# Patient Record
Sex: MALE | Race: WHITE | NOT HISPANIC OR LATINO
[De-identification: names, ages, dates, MRNs, and addresses within clinical notes are randomized per-mention and may not be internally consistent; named-entity substitution may affect disease eponyms.]

---

## 2020-08-06 ENCOUNTER — APPOINTMENT (OUTPATIENT)
Dept: CARDIOLOGY | Facility: CLINIC | Age: 30
End: 2020-08-06
Payer: MEDICAID

## 2020-08-06 VITALS — WEIGHT: 222 LBS | HEIGHT: 71 IN | TEMPERATURE: 97.6 F | BODY MASS INDEX: 31.08 KG/M2 | HEART RATE: 77 BPM

## 2020-08-06 VITALS — DIASTOLIC BLOOD PRESSURE: 84 MMHG | SYSTOLIC BLOOD PRESSURE: 120 MMHG

## 2020-08-06 PROCEDURE — 93000 ELECTROCARDIOGRAM COMPLETE: CPT

## 2020-08-06 PROCEDURE — 99204 OFFICE O/P NEW MOD 45 MIN: CPT

## 2020-08-06 NOTE — ASSESSMENT
[FreeTextEntry1] : The patient has had palpitations and sinus tachycardia and HTN for the past 3 days .BP is borderline on home blood monitor . The patient has had periods of chest pressure . The episodes are fleeting . Aldosterone and renin levels are normal.

## 2020-08-06 NOTE — HISTORY OF PRESENT ILLNESS
[FreeTextEntry1] : The patient has been hypertensive for the past 3 years . He has had increased anxiety which started at that time. He felt as thugh his heart was racing and at times he felt as though his chest felt jolts.  Occasionally gets chest tightness . No DM , She does have a family history of early CAD .

## 2020-08-06 NOTE — PHYSICAL EXAM
[General Appearance - Well Developed] : well developed [Normal Appearance] : normal appearance [Well Groomed] : well groomed [General Appearance - Well Nourished] : well nourished [No Deformities] : no deformities [General Appearance - In No Acute Distress] : no acute distress [Normal Conjunctiva] : the conjunctiva exhibited no abnormalities [Eyelids - No Xanthelasma] : the eyelids demonstrated no xanthelasmas [Normal Oral Mucosa] : normal oral mucosa [No Oral Pallor] : no oral pallor [No Oral Cyanosis] : no oral cyanosis [Normal Jugular Venous A Waves Present] : normal jugular venous A waves present [Normal Jugular Venous V Waves Present] : normal jugular venous V waves present [No Jugular Venous Colon A Waves] : no jugular venous colon A waves [Normal Rate] : normal [Rhythm Regular] : regular [No Murmur] : no murmurs heard [2+] : right 2+ [No Pitting Edema] : no pitting edema present [Respiration, Rhythm And Depth] : normal respiratory rhythm and effort [Exaggerated Use Of Accessory Muscles For Inspiration] : no accessory muscle use [Abdomen Soft] : soft [Auscultation Breath Sounds / Voice Sounds] : lungs were clear to auscultation bilaterally [Abdomen Tenderness] : non-tender [Abdomen Mass (___ Cm)] : no abdominal mass palpated [Gait - Sufficient For Exercise Testing] : the gait was sufficient for exercise testing [Abnormal Walk] : normal gait [Cyanosis, Localized] : no localized cyanosis [Nail Clubbing] : no clubbing of the fingernails [Petechial Hemorrhages (___cm)] : no petechial hemorrhages [Skin Color & Pigmentation] : normal skin color and pigmentation [] : no rash [No Venous Stasis] : no venous stasis [Skin Lesions] : no skin lesions [No Skin Ulcers] : no skin ulcer [No Xanthoma] : no  xanthoma was observed [Oriented To Time, Place, And Person] : oriented to person, place, and time [Mood] : the mood was normal [Affect] : the affect was normal [No Anxiety] : not feeling anxious

## 2020-08-06 NOTE — REASON FOR VISIT
[Hyperlipidemia] : hyperlipidemia [FreeTextEntry1] : Sinus tachycardia .  [Hypertension] : hypertension

## 2020-08-06 NOTE — REVIEW OF SYSTEMS
[Shortness Of Breath] : shortness of breath [Chest Pain] : chest pain [Anxiety] : anxiety [Palpitations] : palpitations [Negative] : Heme/Lymph

## 2020-08-12 ENCOUNTER — APPOINTMENT (OUTPATIENT)
Dept: CARDIOLOGY | Facility: CLINIC | Age: 30
End: 2020-08-12
Payer: MEDICAID

## 2020-08-12 PROCEDURE — 93325 DOPPLER ECHO COLOR FLOW MAPG: CPT

## 2020-08-12 PROCEDURE — 93320 DOPPLER ECHO COMPLETE: CPT

## 2020-08-12 PROCEDURE — 93351 STRESS TTE COMPLETE: CPT

## 2020-09-30 ENCOUNTER — APPOINTMENT (OUTPATIENT)
Dept: CARDIOLOGY | Facility: CLINIC | Age: 30
End: 2020-09-30
Payer: MEDICAID

## 2020-09-30 VITALS — SYSTOLIC BLOOD PRESSURE: 154 MMHG | HEART RATE: 93 BPM | DIASTOLIC BLOOD PRESSURE: 90 MMHG

## 2020-09-30 PROCEDURE — 99214 OFFICE O/P EST MOD 30 MIN: CPT | Mod: 95

## 2020-09-30 NOTE — ASSESSMENT
[FreeTextEntry1] : The patient has had less palpitations but these are fleeting in nature . He is on beta blockers and his BP is controlled to borderline controlled. The patient has had marked anxiety . He is going for cognitive therapy . I have referrrred him to [place] from fear .

## 2020-09-30 NOTE — HISTORY OF PRESENT ILLNESS
[Home] : at home, [unfilled] , at the time of the visit. [Medical Office: ([facility])___] : at the medical office located in  [Verbal consent obtained from patient] : the patient, [unfilled] [FreeTextEntry1] : The patient states that his palpitations occur only during times of anxiety . He had a MCOT monitor . During this time he did have a mild sinus tachycardia, otherwise no arrhythmias . The patient had a stress echo in which he completed stage IV N ischemia on echo . There was a prominent papillary muscle and chordae . Mitral valve itself looked normal No chest pain

## 2020-09-30 NOTE — REVIEW OF SYSTEMS
[Shortness Of Breath] : shortness of breath [Chest Pain] : chest pain [Palpitations] : palpitations [Anxiety] : anxiety [Negative] : Heme/Lymph

## 2020-09-30 NOTE — PHYSICAL EXAM
[General Appearance - Well Developed] : well developed [Normal Appearance] : normal appearance [Well Groomed] : well groomed [General Appearance - Well Nourished] : well nourished [No Deformities] : no deformities [General Appearance - In No Acute Distress] : no acute distress [Normal Conjunctiva] : the conjunctiva exhibited no abnormalities [Eyelids - No Xanthelasma] : the eyelids demonstrated no xanthelasmas [Normal Oral Mucosa] : normal oral mucosa [No Oral Pallor] : no oral pallor [No Oral Cyanosis] : no oral cyanosis [Normal Jugular Venous A Waves Present] : normal jugular venous A waves present [Normal Jugular Venous V Waves Present] : normal jugular venous V waves present [No Jugular Venous Colon A Waves] : no jugular venous colon A waves [Respiration, Rhythm And Depth] : normal respiratory rhythm and effort [Exaggerated Use Of Accessory Muscles For Inspiration] : no accessory muscle use [Auscultation Breath Sounds / Voice Sounds] : lungs were clear to auscultation bilaterally [Abdomen Soft] : soft [Abdomen Tenderness] : non-tender [Abdomen Mass (___ Cm)] : no abdominal mass palpated [Abnormal Walk] : normal gait [Gait - Sufficient For Exercise Testing] : the gait was sufficient for exercise testing [Nail Clubbing] : no clubbing of the fingernails [Cyanosis, Localized] : no localized cyanosis [Petechial Hemorrhages (___cm)] : no petechial hemorrhages [Skin Color & Pigmentation] : normal skin color and pigmentation [] : no rash [No Venous Stasis] : no venous stasis [Skin Lesions] : no skin lesions [No Skin Ulcers] : no skin ulcer [No Xanthoma] : no  xanthoma was observed [Oriented To Time, Place, And Person] : oriented to person, place, and time [Affect] : the affect was normal [Mood] : the mood was normal [No Anxiety] : not feeling anxious [Normal Rate] : normal [Rhythm Regular] : regular [No Murmur] : no murmurs heard [2+] : left 2+ [No Pitting Edema] : no pitting edema present

## 2020-09-30 NOTE — REASON FOR VISIT
[Follow-Up - Clinic] : a clinic follow-up of [Hyperlipidemia] : hyperlipidemia [Hypertension] : hypertension [FreeTextEntry1] : Sinus tachycardia .

## 2020-10-14 ENCOUNTER — APPOINTMENT (OUTPATIENT)
Dept: CARDIOLOGY | Facility: CLINIC | Age: 30
End: 2020-10-14

## 2020-11-19 ENCOUNTER — APPOINTMENT (OUTPATIENT)
Dept: CARDIOLOGY | Facility: CLINIC | Age: 30
End: 2020-11-19
Payer: MEDICAID

## 2020-11-19 VITALS — HEART RATE: 92 BPM | TEMPERATURE: 97.5 F | WEIGHT: 206 LBS | BODY MASS INDEX: 28.84 KG/M2 | HEIGHT: 71 IN

## 2020-11-19 VITALS — DIASTOLIC BLOOD PRESSURE: 80 MMHG | SYSTOLIC BLOOD PRESSURE: 122 MMHG

## 2020-11-19 PROCEDURE — 93000 ELECTROCARDIOGRAM COMPLETE: CPT

## 2020-11-19 PROCEDURE — 99214 OFFICE O/P EST MOD 30 MIN: CPT

## 2020-11-19 NOTE — ASSESSMENT
[FreeTextEntry1] : The patient has a prominent papillary  muscle and has panic attacks . He has had a MCOT monitor . He also has calcification of the mitral chordae . In view of ECG changes  and vague symptoms will  get cardiac MRI .

## 2020-11-19 NOTE — HISTORY OF PRESENT ILLNESS
[FreeTextEntry1] : The patient states that his palpitations occur only during times of anxiety . He had a MCOT monitor . During this time he did have a mild sinus tachycardia, otherwise no arrhythmias . The patient had a stress echo in which he completed stage IV N ischemia on echo . There was a prominent papillary muscle and chordae . Mitral valve itself looked normal No chest pain. Palpitations have been improved .

## 2020-12-13 ENCOUNTER — EMERGENCY (EMERGENCY)
Facility: HOSPITAL | Age: 30
LOS: 0 days | Discharge: HOME | End: 2020-12-13
Attending: EMERGENCY MEDICINE | Admitting: EMERGENCY MEDICINE
Payer: MEDICAID

## 2020-12-13 VITALS
RESPIRATION RATE: 18 BRPM | HEART RATE: 98 BPM | DIASTOLIC BLOOD PRESSURE: 87 MMHG | OXYGEN SATURATION: 99 % | TEMPERATURE: 97 F | WEIGHT: 197.98 LBS | SYSTOLIC BLOOD PRESSURE: 146 MMHG | HEIGHT: 70 IN

## 2020-12-13 VITALS
OXYGEN SATURATION: 99 % | DIASTOLIC BLOOD PRESSURE: 91 MMHG | HEART RATE: 97 BPM | RESPIRATION RATE: 16 BRPM | SYSTOLIC BLOOD PRESSURE: 144 MMHG

## 2020-12-13 DIAGNOSIS — Z87.09 PERSONAL HISTORY OF OTHER DISEASES OF THE RESPIRATORY SYSTEM: ICD-10-CM

## 2020-12-13 DIAGNOSIS — R20.2 PARESTHESIA OF SKIN: ICD-10-CM

## 2020-12-13 DIAGNOSIS — Z20.828 CONTACT WITH AND (SUSPECTED) EXPOSURE TO OTHER VIRAL COMMUNICABLE DISEASES: ICD-10-CM

## 2020-12-13 DIAGNOSIS — R51.9 HEADACHE, UNSPECIFIED: ICD-10-CM

## 2020-12-13 DIAGNOSIS — Z86.59 PERSONAL HISTORY OF OTHER MENTAL AND BEHAVIORAL DISORDERS: ICD-10-CM

## 2020-12-13 DIAGNOSIS — I10 ESSENTIAL (PRIMARY) HYPERTENSION: ICD-10-CM

## 2020-12-13 PROCEDURE — 70450 CT HEAD/BRAIN W/O DYE: CPT | Mod: 26

## 2020-12-13 PROCEDURE — 99284 EMERGENCY DEPT VISIT MOD MDM: CPT

## 2020-12-13 RX ORDER — ACETAMINOPHEN 500 MG
975 TABLET ORAL ONCE
Refills: 0 | Status: COMPLETED | OUTPATIENT
Start: 2020-12-13 | End: 2020-12-13

## 2020-12-13 RX ADMIN — Medication 975 MILLIGRAM(S): at 03:24

## 2020-12-13 NOTE — ED PROVIDER NOTE - OBJECTIVE STATEMENT
31 yo male hx of anxiety present c/o right sided headache x 1 hour. reported he woke up around 2am with right sided headache. reported it was sharp/warm sensation and subsided gradually. also reported right sided facial tingling sensation. reported he had similar symptoms off/on for a month. He was evaluated by his PMD and Neurologist and had MRI 2 weeks ago without significant findings.   Denies Dizziness/slur speech/extremities weakness and numbness/ facial numbness and weakness. Denies Fever/chill/recent illness/coughing/chest pain/sob/abd pain/n/v/d/extremities pain/urinary sxs.

## 2020-12-13 NOTE — ED PROVIDER NOTE - PROGRESS NOTE DETAILS
Upon discharge, patient also request for COVID test. patient is asymtomatic. will send a routine COVID HA improved. Upon discharge, patient also request for COVID test. patient is asymtomatic. will send a routine COVID

## 2020-12-13 NOTE — ED PROVIDER NOTE - CARE PROVIDER_API CALL
Jorje Patterson  NEUROLOGY  [address] [place] [id number]  Phone: [phone]  Fax: [phone]  Follow Up Time:

## 2020-12-13 NOTE — ED PROVIDER NOTE - ATTENDING CONTRIBUTION TO CARE
I personally evaluated the patient. I reviewed the Resident’s or Physician Assistant’s note (as assigned above), and agree with the findings and plan except as documented in my note.  Chart reviewed.  H/O anxiety disorder, woke up with right sided headache and paresthesias tonight, describes it as a zap.  No fever or photophobia.  Feels better now.  Has seen a neurologist for same symptoms which has been going on for a month.  Exam shows alert in no distress, HEENT NCAT, PERRL, neck supple, lungs clear, RR S1S2, abdomen soft NT +BS, no rash, neuro A&OX3 no deficits.

## 2020-12-13 NOTE — ED PROVIDER NOTE - PATIENT PORTAL LINK FT
You can access the FollowMyHealth Patient Portal offered by [facility] by registering at the following website: [email or web address]. By joining [employer]’s FollowMyHealth portal, you will also be able to view your health information using other applications (apps) compatible with our system.

## 2020-12-13 NOTE — ED PROVIDER NOTE - NSFOLLOWUPCLINICS_GEN_ALL_ED_FT
[facility]  OP [facility]  [address] [place] [id number]  Phone: [phone]  Fax:   Follow Up Time:

## 2020-12-13 NOTE — ED ADULT NURSE NOTE - CAS DISCH ACCOMP BY
Thanks for visiting us today!    You were given a packet of information handouts at today's well child exam. Please keep them handy for future reference.        Remember these important phone numbers:    [phone] for phone nurses during the day and our nurse answering service at night    [phone]  for scheduling or changing future appointments    When leaving a message for our staff, please include:   • the spelling of your child’s full name and date of birth  • your full name and relationship to child  • best phone number and time to reach you   • reason for the call    Is your child signed up for [employer]? If you do this, you can message us rather than playing phone tag! Go to your own account first. On the right hand side of your page, click the button marked \"Request Access to my Child's Records.\" Fill out the information. In a few day's your child's information will be linked to your account. It's that simple!! Here is the website for more information:     [email or web address]    If you haven't already liked us on [employer], please do so!  Just search for \"[facility]\"      --------------------------------------------------------------------------------------------------------------------  You have been referred for an appointment in the nutrition department.    Please call your insurance company to see if a referral is necessary.  If your insurance company says a referral is needed, please call our office at [phone].  Ask to speak to a referral specialist, ext. [phone] or [phone], and give them the following information:    1) Appointment date    2) Specialist's full name (first and last name)    3) Who referred    4) Diagnosis (BMI 99th percentile)    5) Name of the insurance company    Without this information the referral paperwork cannot be completed.         Self

## 2020-12-13 NOTE — ED PROVIDER NOTE - PHYSICAL EXAMINATION
CONSTITUTIONAL: Well-appearing; well-nourished; in no apparent distress.   EYES: PERRL; EOM intact.   ENT: normal nose; no rhinorrhea; normal pharynx with no tonsillar hypertrophy. TM nml b/l.   CARDIOVASCULAR: Normal S1, S2; no murmurs, rubs, or gallops.   RESPIRATORY: Normal chest excursion with respiration; breath sounds clear and equal bilaterally; no wheezes, rhonchi, or rales.  GI/: Normal bowel sounds; non-distended; non-tender; no palpable organomegaly.   MS: No evidence of trauma or deformity to extremities. no midline tenderness to neck. + mild ttp over right occipital muscle and trapezius.   SKIN: Normal for age and race; warm; dry; good turgor; no apparent lesions or exudate.   NEURO/PSYCH: A & O x 4; CN II-XII grossly unremarkable. no drifting. strength equal to b/l upper and lower extremities. speaking coherently. nml cerebellum test. ambulate with nml and steady gait

## 2020-12-13 NOTE — ED ADULT TRIAGE NOTE - CHIEF COMPLAINT QUOTE
tingling right side head/face, woke up with a zap of pain right side head, saw neurologist and going for second opinion

## 2020-12-13 NOTE — ED PROVIDER NOTE - DISCHARGE REVIEW MATERIAL PRESENTED
[FreeTextEntry1] : Basic cardiovascular prevention measures are advised including regular exercise, surveillance medical examination, and prudent portion-controlled low fat diet, rich in a variety of vegetables with minimal added sugars, refined starches, and no artificially hydrogenated oils.\par add toprol xl 50 mg 1 qd\par cont losartan and amlodipine. \par obtain labs from oncology\par bone density from [facility]\par We spent sufficient time to discuss aspects of care; questions were answered  to patient's satisfaction.The diagnosis and care plan were discussed with patient in detail.  Patient test results were  reviewed and explained in full. All questions and concerns  were answered to the best of my knowledge.\par 
.

## 2020-12-14 LAB — SARS-COV-2 RNA SPEC QL NAA+PROBE: SIGNIFICANT CHANGE UP

## 2020-12-16 PROBLEM — F41.9 ANXIETY DISORDER, UNSPECIFIED: Chronic | Status: ACTIVE | Noted: 2020-12-13

## 2020-12-16 PROBLEM — R42 DIZZINESS AND GIDDINESS: Chronic | Status: ACTIVE | Noted: 2020-12-13

## 2020-12-16 PROBLEM — I10 ESSENTIAL (PRIMARY) HYPERTENSION: Chronic | Status: ACTIVE | Noted: 2020-12-13

## 2020-12-16 PROBLEM — R00.2 PALPITATIONS: Chronic | Status: ACTIVE | Noted: 2020-12-13

## 2020-12-16 PROBLEM — J32.9 CHRONIC SINUSITIS, UNSPECIFIED: Chronic | Status: ACTIVE | Noted: 2020-12-13

## 2020-12-22 ENCOUNTER — RESULT REVIEW (OUTPATIENT)
Age: 30
End: 2020-12-22

## 2020-12-22 ENCOUNTER — OUTPATIENT (OUTPATIENT)
Dept: OUTPATIENT SERVICES | Facility: HOSPITAL | Age: 30
LOS: 1 days | Discharge: HOME | End: 2020-12-22
Payer: MEDICAID

## 2020-12-22 DIAGNOSIS — R94.31 ABNORMAL ELECTROCARDIOGRAM [ECG] [EKG]: ICD-10-CM

## 2020-12-22 DIAGNOSIS — R00.2 PALPITATIONS: ICD-10-CM

## 2020-12-22 PROCEDURE — 75561 CARDIAC MRI FOR MORPH W/DYE: CPT | Mod: 26

## 2021-01-16 ENCOUNTER — APPOINTMENT (OUTPATIENT)
Dept: CARDIOLOGY | Facility: CLINIC | Age: 31
End: 2021-01-16
Payer: MEDICAID

## 2021-01-16 VITALS
SYSTOLIC BLOOD PRESSURE: 140 MMHG | BODY MASS INDEX: 28.28 KG/M2 | HEART RATE: 89 BPM | TEMPERATURE: 98.6 F | WEIGHT: 202 LBS | DIASTOLIC BLOOD PRESSURE: 80 MMHG | HEIGHT: 71 IN

## 2021-01-16 VITALS — SYSTOLIC BLOOD PRESSURE: 124 MMHG | DIASTOLIC BLOOD PRESSURE: 80 MMHG

## 2021-01-16 DIAGNOSIS — Z78.9 OTHER SPECIFIED HEALTH STATUS: ICD-10-CM

## 2021-01-16 PROCEDURE — 99214 OFFICE O/P EST MOD 30 MIN: CPT

## 2021-01-16 PROCEDURE — 93000 ELECTROCARDIOGRAM COMPLETE: CPT

## 2021-01-16 PROCEDURE — 99072 ADDL SUPL MATRL&STAF TM PHE: CPT

## 2021-01-16 NOTE — ASSESSMENT
[FreeTextEntry1] : The patient has a prominent papillary  muscle and has panic attacks . MRI was normal. No late saqib as well.  No significaint arrhythmias on MCOT monitor . On beta blockers . ETT echo was negative for ischemia Much of his palpitations may have been anxiety related as well, certainly a component of this . Catecholamines  and metanephrines were normal.

## 2021-01-16 NOTE — PHYSICAL EXAM
[Normal Appearance] : normal appearance [General Appearance - Well Developed] : well developed [Well Groomed] : well groomed [General Appearance - Well Nourished] : well nourished [No Deformities] : no deformities [Normal Conjunctiva] : the conjunctiva exhibited no abnormalities [General Appearance - In No Acute Distress] : no acute distress [Eyelids - No Xanthelasma] : the eyelids demonstrated no xanthelasmas [Normal Oral Mucosa] : normal oral mucosa [No Oral Pallor] : no oral pallor [No Oral Cyanosis] : no oral cyanosis [Normal Jugular Venous A Waves Present] : normal jugular venous A waves present [Normal Jugular Venous V Waves Present] : normal jugular venous V waves present [No Jugular Venous Colon A Waves] : no jugular venous colon A waves [Respiration, Rhythm And Depth] : normal respiratory rhythm and effort [Exaggerated Use Of Accessory Muscles For Inspiration] : no accessory muscle use [Auscultation Breath Sounds / Voice Sounds] : lungs were clear to auscultation bilaterally [Abdomen Soft] : soft [Abdomen Tenderness] : non-tender [Abdomen Mass (___ Cm)] : no abdominal mass palpated [Gait - Sufficient For Exercise Testing] : the gait was sufficient for exercise testing [Abnormal Walk] : normal gait [Nail Clubbing] : no clubbing of the fingernails [Cyanosis, Localized] : no localized cyanosis [Petechial Hemorrhages (___cm)] : no petechial hemorrhages [] : no rash [Skin Color & Pigmentation] : normal skin color and pigmentation [No Venous Stasis] : no venous stasis [Skin Lesions] : no skin lesions [No Skin Ulcers] : no skin ulcer [No Xanthoma] : no  xanthoma was observed [Oriented To Time, Place, And Person] : oriented to person, place, and time [Affect] : the affect was normal [Mood] : the mood was normal [No Anxiety] : not feeling anxious [Normal Rate] : normal [Rhythm Regular] : regular [No Murmur] : no murmurs heard [2+] : left 2+ [No Pitting Edema] : no pitting edema present

## 2021-01-16 NOTE — HISTORY OF PRESENT ILLNESS
[FreeTextEntry1] : The patient has had less palpitations . He is going to a therapist for his anxiety . MRI showed structurally normal heart . Had one episode of CP left upper chest which was fleeting . . ETT echo was negative for ischemia at high exercise load . The patient had partially  enlarged sleen . Work up according to the patient was c/w fatty infiiltration

## 2021-02-08 ENCOUNTER — EMERGENCY (EMERGENCY)
Facility: HOSPITAL | Age: 31
LOS: 0 days | Discharge: HOME | End: 2021-02-08
Attending: STUDENT IN AN ORGANIZED HEALTH CARE EDUCATION/TRAINING PROGRAM | Admitting: STUDENT IN AN ORGANIZED HEALTH CARE EDUCATION/TRAINING PROGRAM
Payer: MEDICAID

## 2021-02-08 VITALS
SYSTOLIC BLOOD PRESSURE: 150 MMHG | RESPIRATION RATE: 20 BRPM | DIASTOLIC BLOOD PRESSURE: 94 MMHG | WEIGHT: 195.11 LBS | HEART RATE: 107 BPM | HEIGHT: 70 IN | OXYGEN SATURATION: 96 % | TEMPERATURE: 101 F

## 2021-02-08 VITALS — HEART RATE: 95 BPM

## 2021-02-08 DIAGNOSIS — F41.9 ANXIETY DISORDER, UNSPECIFIED: ICD-10-CM

## 2021-02-08 DIAGNOSIS — I10 ESSENTIAL (PRIMARY) HYPERTENSION: ICD-10-CM

## 2021-02-08 DIAGNOSIS — R06.02 SHORTNESS OF BREATH: ICD-10-CM

## 2021-02-08 DIAGNOSIS — Z79.899 OTHER LONG TERM (CURRENT) DRUG THERAPY: ICD-10-CM

## 2021-02-08 DIAGNOSIS — R07.9 CHEST PAIN, UNSPECIFIED: ICD-10-CM

## 2021-02-08 DIAGNOSIS — R07.89 OTHER CHEST PAIN: ICD-10-CM

## 2021-02-08 DIAGNOSIS — Z20.822 CONTACT WITH AND (SUSPECTED) EXPOSURE TO COVID-19: ICD-10-CM

## 2021-02-08 LAB
ALBUMIN SERPL ELPH-MCNC: 5.6 G/DL — HIGH (ref 3.5–5.2)
ALP SERPL-CCNC: 75 U/L — SIGNIFICANT CHANGE UP (ref 30–115)
ALT FLD-CCNC: 27 U/L — SIGNIFICANT CHANGE UP (ref 0–41)
ANION GAP SERPL CALC-SCNC: 14 MMOL/L — SIGNIFICANT CHANGE UP (ref 7–14)
AST SERPL-CCNC: 21 U/L — SIGNIFICANT CHANGE UP (ref 0–41)
BASOPHILS # BLD AUTO: 0.03 K/UL — SIGNIFICANT CHANGE UP (ref 0–0.2)
BASOPHILS NFR BLD AUTO: 0.4 % — SIGNIFICANT CHANGE UP (ref 0–1)
BILIRUB SERPL-MCNC: 0.7 MG/DL — SIGNIFICANT CHANGE UP (ref 0.2–1.2)
BUN SERPL-MCNC: 8 MG/DL — LOW (ref 10–20)
CALCIUM SERPL-MCNC: 10.3 MG/DL — HIGH (ref 8.5–10.1)
CHLORIDE SERPL-SCNC: 100 MMOL/L — SIGNIFICANT CHANGE UP (ref 98–110)
CO2 SERPL-SCNC: 28 MMOL/L — SIGNIFICANT CHANGE UP (ref 17–32)
CREAT SERPL-MCNC: 1 MG/DL — SIGNIFICANT CHANGE UP (ref 0.7–1.5)
D DIMER BLD IA.RAPID-MCNC: 98 NG/ML DDU — SIGNIFICANT CHANGE UP (ref 0–230)
EOSINOPHIL # BLD AUTO: 0.02 K/UL — SIGNIFICANT CHANGE UP (ref 0–0.7)
EOSINOPHIL NFR BLD AUTO: 0.2 % — SIGNIFICANT CHANGE UP (ref 0–8)
GLUCOSE SERPL-MCNC: 82 MG/DL — SIGNIFICANT CHANGE UP (ref 70–99)
HCT VFR BLD CALC: 47.3 % — SIGNIFICANT CHANGE UP (ref 42–52)
HGB BLD-MCNC: 16.7 G/DL — SIGNIFICANT CHANGE UP (ref 14–18)
IMM GRANULOCYTES NFR BLD AUTO: 0.2 % — SIGNIFICANT CHANGE UP (ref 0.1–0.3)
LYMPHOCYTES # BLD AUTO: 1.48 K/UL — SIGNIFICANT CHANGE UP (ref 1.2–3.4)
LYMPHOCYTES # BLD AUTO: 17.9 % — LOW (ref 20.5–51.1)
MCHC RBC-ENTMCNC: 28.7 PG — SIGNIFICANT CHANGE UP (ref 27–31)
MCHC RBC-ENTMCNC: 35.3 G/DL — SIGNIFICANT CHANGE UP (ref 32–37)
MCV RBC AUTO: 81.3 FL — SIGNIFICANT CHANGE UP (ref 80–94)
MONOCYTES # BLD AUTO: 0.55 K/UL — SIGNIFICANT CHANGE UP (ref 0.1–0.6)
MONOCYTES NFR BLD AUTO: 6.7 % — SIGNIFICANT CHANGE UP (ref 1.7–9.3)
NEUTROPHILS # BLD AUTO: 6.15 K/UL — SIGNIFICANT CHANGE UP (ref 1.4–6.5)
NEUTROPHILS NFR BLD AUTO: 74.6 % — SIGNIFICANT CHANGE UP (ref 42.2–75.2)
NRBC # BLD: 0 /100 WBCS — SIGNIFICANT CHANGE UP (ref 0–0)
PLATELET # BLD AUTO: 282 K/UL — SIGNIFICANT CHANGE UP (ref 130–400)
POTASSIUM SERPL-MCNC: 3.8 MMOL/L — SIGNIFICANT CHANGE UP (ref 3.5–5)
POTASSIUM SERPL-SCNC: 3.8 MMOL/L — SIGNIFICANT CHANGE UP (ref 3.5–5)
PROT SERPL-MCNC: 8.1 G/DL — HIGH (ref 6–8)
RBC # BLD: 5.82 M/UL — SIGNIFICANT CHANGE UP (ref 4.7–6.1)
RBC # FLD: 12.6 % — SIGNIFICANT CHANGE UP (ref 11.5–14.5)
SODIUM SERPL-SCNC: 142 MMOL/L — SIGNIFICANT CHANGE UP (ref 135–146)
TROPONIN T SERPL-MCNC: <0.01 NG/ML — SIGNIFICANT CHANGE UP
WBC # BLD: 8.25 K/UL — SIGNIFICANT CHANGE UP (ref 4.8–10.8)
WBC # FLD AUTO: 8.25 K/UL — SIGNIFICANT CHANGE UP (ref 4.8–10.8)

## 2021-02-08 PROCEDURE — 71046 X-RAY EXAM CHEST 2 VIEWS: CPT | Mod: 26

## 2021-02-08 PROCEDURE — 99285 EMERGENCY DEPT VISIT HI MDM: CPT

## 2021-02-08 RX ORDER — FLUTICASONE PROPIONATE 50 MCG
1 SPRAY, SUSPENSION NASAL
Qty: 0 | Refills: 0 | DISCHARGE

## 2021-02-08 RX ORDER — PANTOPRAZOLE SODIUM 20 MG/1
40 TABLET, DELAYED RELEASE ORAL ONCE
Refills: 0 | Status: DISCONTINUED | OUTPATIENT
Start: 2021-02-08 | End: 2021-02-08

## 2021-02-08 RX ORDER — ACETAMINOPHEN 500 MG
975 TABLET ORAL ONCE
Refills: 0 | Status: COMPLETED | OUTPATIENT
Start: 2021-02-08 | End: 2021-02-08

## 2021-02-08 RX ADMIN — Medication 975 MILLIGRAM(S): at 18:10

## 2021-02-08 NOTE — ED PROVIDER NOTE - ATTENDING CONTRIBUTION TO CARE
29 yo M no pmh pw chest pain. Midsternal sharp chest pain radiating towards epigastrium, associated with sob. No n/v, no abd pain, no back pain, no palpitations, no n/v/d, no fever/chills, no urinary sx, no LH, no dizziness.     CONSTITUTIONAL: Well-developed; well-nourished; in no acute distress. Sitting up and providing appropriate history and physical examination  SKIN: skin exam is warm and dry, no acute rash.  HEAD: Normocephalic; atraumatic.  EYES: PERRL, 3 mm bilateral, no nystagmus, EOM intact; conjunctiva and sclera clear.  ENT: No nasal discharge; airway clear.  NECK: Supple; non tender. + full passive ROM in all directions. No JVD  CARD: S1, S2 normal; no murmurs, gallops, or rubs. Regular rate and rhythm. + Symmetric Strong Pulses  RESP: No wheezes, rales or rhonchi. Good air movement bilaterally  ABD: soft; non-distended; non-tender. No Rebound, No Guarding, No signs of peritonitis, No CVA tenderness. No pulsatile abdominal mass. + Strong and Symmetric Pulses  EXT: Normal ROM. No clubbing, cyanosis or edema. Dp and Pt Pulses intact. Cap refill less than 3 seconds  NEURO: CN 2-12 intact, normal finger to nose, normal romberg, stable gait, no sensory or motor deficits, Alert, oriented, grossly unremarkable. No Focal deficits. GCS 15. NIH 0  PSYCH: Cooperative, appropriate.

## 2021-02-08 NOTE — ED PROVIDER NOTE - PROGRESS NOTE DETAILS
BI: per patient, HR normally high. Pt takes metoprolol and anxiety medication which keeps it in the 90s, which he hasn't taken today. Repeat on pulse ox 98. PT stable for d/c with cardiologist follow up. Return precautions discussed.

## 2021-02-08 NOTE — ED ADULT TRIAGE NOTE - CHIEF COMPLAINT QUOTE
Patient states "I have chest pain left upper abdomen pain with refux potential hiatal hernia Dr Fox I went to him today and he did an EKG in office said it was fine"

## 2021-02-08 NOTE — ED PROVIDER NOTE - PHYSICAL EXAMINATION
CONSTITUTIONAL: well-appearing, in NAD  SKIN: Warm dry, normal skin turgor  HEAD: NCAT  EYES: EOMI, PERRLA, no scleral icterus, conjunctiva pink  ENT: normal pharynx with no erythema or exudates  NECK: Supple; non tender. Full ROM.  CARD: tachy, no murmurs.  RESP: clear to ausculation b/l. No crackles or wheezing.  ABD: soft, non-tender, non-distended, no rebound or guarding.  EXT: Full ROM, no bony tenderness, no pedal edema, no calf tenderness  NEURO: normal motor. normal sensory. Normal gait.  PSYCH: Cooperative, appropriate.

## 2021-02-08 NOTE — ED PROVIDER NOTE - OBJECTIVE STATEMENT
30 y.o M w/ pmhx anxiety and HTN p/w CP that began earlier today in two episodes. The first episode pt took deep breaths and it went away. The second episode prompted pt to come to ED. CP is sternal, moderate, radiating to sternum, without exacerbating factors. No fever, no cough, no sob, no abd pain, no n/v, no dizziness, no numbness or tingling. Nonsmoker, + family history in mother of CAD. No recent travel.

## 2021-02-08 NOTE — ED PROVIDER NOTE - CLINICAL SUMMARY MEDICAL DECISION MAKING FREE TEXT BOX
I personally evaluated the patient. I reviewed the Resident’s or Physician Assistant’s note (as assigned above), and agree with the findings and plan except as documented in my note. Patient evaluated for cp. Labs, ekg, cxr performed in ED. Patient vs stable and well appearing prior to discharge. Given cardiology f/u, HEART score 0. I have fully discussed the medical management and delivery of care with the patient. I have discussed any available labs, imaging and treatment options with the patient. Patient confirms understanding and has been given detailed return precautions. Patient instructed to return to the ED should symptoms persist or worsen. Patient has demonstrated capacity and has verbalized understanding. Patient is well appearing upon discharge.

## 2021-02-08 NOTE — ED PROVIDER NOTE - PATIENT PORTAL LINK FT
You can access the FollowMyHealth Patient Portal offered by [facility] by registering at the following website: [email or web address]. By joining [employer]’s FollowMyHealth portal, you will also be able to view your health information using other applications (apps) compatible with our system.

## 2021-02-08 NOTE — ED PROVIDER NOTE - CARE PROVIDER_API CALL
Melody Burger)  Cardiovascular Disease; Internal Medicine  [address] [place] [address]  Phone: [phone]  Fax: [phone]  Follow Up Time: 1-3 Days

## 2021-02-09 LAB — SARS-COV-2 RNA SPEC QL NAA+PROBE: SIGNIFICANT CHANGE UP

## 2021-02-11 ENCOUNTER — APPOINTMENT (OUTPATIENT)
Dept: CARDIOLOGY | Facility: CLINIC | Age: 31
End: 2021-02-11
Payer: MEDICAID

## 2021-02-11 VITALS
HEART RATE: 87 BPM | DIASTOLIC BLOOD PRESSURE: 80 MMHG | SYSTOLIC BLOOD PRESSURE: 128 MMHG | BODY MASS INDEX: 27.16 KG/M2 | WEIGHT: 194 LBS | HEIGHT: 71 IN | TEMPERATURE: 97.3 F

## 2021-02-11 PROCEDURE — 93000 ELECTROCARDIOGRAM COMPLETE: CPT

## 2021-02-11 PROCEDURE — 99214 OFFICE O/P EST MOD 30 MIN: CPT

## 2021-02-11 PROCEDURE — 99072 ADDL SUPL MATRL&STAF TM PHE: CPT

## 2021-02-11 NOTE — PHYSICAL EXAM
[Normal Appearance] : normal appearance [General Appearance - Well Developed] : well developed [Well Groomed] : well groomed [General Appearance - Well Nourished] : well nourished [No Deformities] : no deformities [General Appearance - In No Acute Distress] : no acute distress [Normal Conjunctiva] : the conjunctiva exhibited no abnormalities [Eyelids - No Xanthelasma] : the eyelids demonstrated no xanthelasmas [Normal Oral Mucosa] : normal oral mucosa [No Oral Pallor] : no oral pallor [No Oral Cyanosis] : no oral cyanosis [Normal Jugular Venous A Waves Present] : normal jugular venous A waves present [Normal Jugular Venous V Waves Present] : normal jugular venous V waves present [No Jugular Venous Colon A Waves] : no jugular venous colon A waves [Respiration, Rhythm And Depth] : normal respiratory rhythm and effort [Exaggerated Use Of Accessory Muscles For Inspiration] : no accessory muscle use [Auscultation Breath Sounds / Voice Sounds] : lungs were clear to auscultation bilaterally [Abdomen Soft] : soft [Abdomen Tenderness] : non-tender [Abdomen Mass (___ Cm)] : no abdominal mass palpated [Gait - Sufficient For Exercise Testing] : the gait was sufficient for exercise testing [Abnormal Walk] : normal gait [Nail Clubbing] : no clubbing of the fingernails [Cyanosis, Localized] : no localized cyanosis [Petechial Hemorrhages (___cm)] : no petechial hemorrhages [Skin Color & Pigmentation] : normal skin color and pigmentation [] : no rash [No Venous Stasis] : no venous stasis [Skin Lesions] : no skin lesions [No Skin Ulcers] : no skin ulcer [No Xanthoma] : no  xanthoma was observed [Oriented To Time, Place, And Person] : oriented to person, place, and time [Affect] : the affect was normal [Mood] : the mood was normal [No Anxiety] : not feeling anxious [Normal Rate] : normal [Rhythm Regular] : regular [No Murmur] : no murmurs heard [2+] : left 2+ [No Pitting Edema] : no pitting edema present

## 2021-02-11 NOTE — ASSESSMENT
[FreeTextEntry1] : The patient had chest pain on Monday which was prolonged . The patient was told that he had an "enlarge esophagus . He had a Cardiac MRI which was normal and there was no abnormal structure in the LV . There was not saqib enhancement . The patient  has an abnormal ECG with an IVCD . The patient has long standing anxiety of uncertain etiology . Do not think will be able to get his HR low enough to do a CTA of the coronary arteries. He does not have an exertional component  doubful that this is from CAD or anomoulus coronary arteiries

## 2021-04-05 ENCOUNTER — EMERGENCY (EMERGENCY)
Facility: HOSPITAL | Age: 31
LOS: 0 days | Discharge: HOME | End: 2021-04-05
Attending: EMERGENCY MEDICINE | Admitting: EMERGENCY MEDICINE
Payer: MEDICAID

## 2021-04-05 VITALS
HEIGHT: 70 IN | HEART RATE: 114 BPM | RESPIRATION RATE: 18 BRPM | SYSTOLIC BLOOD PRESSURE: 141 MMHG | TEMPERATURE: 96 F | OXYGEN SATURATION: 95 % | WEIGHT: 186.95 LBS | DIASTOLIC BLOOD PRESSURE: 84 MMHG

## 2021-04-05 VITALS
RESPIRATION RATE: 18 BRPM | TEMPERATURE: 97 F | HEART RATE: 80 BPM | DIASTOLIC BLOOD PRESSURE: 70 MMHG | OXYGEN SATURATION: 95 % | SYSTOLIC BLOOD PRESSURE: 126 MMHG

## 2021-04-05 DIAGNOSIS — R10.9 UNSPECIFIED ABDOMINAL PAIN: ICD-10-CM

## 2021-04-05 DIAGNOSIS — Z87.19 PERSONAL HISTORY OF OTHER DISEASES OF THE DIGESTIVE SYSTEM: ICD-10-CM

## 2021-04-05 DIAGNOSIS — R07.9 CHEST PAIN, UNSPECIFIED: ICD-10-CM

## 2021-04-05 DIAGNOSIS — F41.9 ANXIETY DISORDER, UNSPECIFIED: ICD-10-CM

## 2021-04-05 DIAGNOSIS — Z79.899 OTHER LONG TERM (CURRENT) DRUG THERAPY: ICD-10-CM

## 2021-04-05 DIAGNOSIS — I10 ESSENTIAL (PRIMARY) HYPERTENSION: ICD-10-CM

## 2021-04-05 LAB
ALBUMIN SERPL ELPH-MCNC: 5 G/DL — SIGNIFICANT CHANGE UP (ref 3.5–5.2)
ALP SERPL-CCNC: 73 U/L — SIGNIFICANT CHANGE UP (ref 30–115)
ALT FLD-CCNC: 16 U/L — SIGNIFICANT CHANGE UP (ref 0–41)
ANION GAP SERPL CALC-SCNC: 14 MMOL/L — SIGNIFICANT CHANGE UP (ref 7–14)
AST SERPL-CCNC: 18 U/L — SIGNIFICANT CHANGE UP (ref 0–41)
BASOPHILS # BLD AUTO: 0.02 K/UL — SIGNIFICANT CHANGE UP (ref 0–0.2)
BASOPHILS NFR BLD AUTO: 0.2 % — SIGNIFICANT CHANGE UP (ref 0–1)
BILIRUB SERPL-MCNC: 0.7 MG/DL — SIGNIFICANT CHANGE UP (ref 0.2–1.2)
BUN SERPL-MCNC: 10 MG/DL — SIGNIFICANT CHANGE UP (ref 10–20)
CALCIUM SERPL-MCNC: 9.9 MG/DL — SIGNIFICANT CHANGE UP (ref 8.5–10.1)
CHLORIDE SERPL-SCNC: 103 MMOL/L — SIGNIFICANT CHANGE UP (ref 98–110)
CO2 SERPL-SCNC: 24 MMOL/L — SIGNIFICANT CHANGE UP (ref 17–32)
CREAT SERPL-MCNC: 1 MG/DL — SIGNIFICANT CHANGE UP (ref 0.7–1.5)
EOSINOPHIL # BLD AUTO: 0.04 K/UL — SIGNIFICANT CHANGE UP (ref 0–0.7)
EOSINOPHIL NFR BLD AUTO: 0.5 % — SIGNIFICANT CHANGE UP (ref 0–8)
GLUCOSE SERPL-MCNC: 83 MG/DL — SIGNIFICANT CHANGE UP (ref 70–99)
HCT VFR BLD CALC: 45.1 % — SIGNIFICANT CHANGE UP (ref 42–52)
HGB BLD-MCNC: 16 G/DL — SIGNIFICANT CHANGE UP (ref 14–18)
IMM GRANULOCYTES NFR BLD AUTO: 0.2 % — SIGNIFICANT CHANGE UP (ref 0.1–0.3)
LYMPHOCYTES # BLD AUTO: 1.45 K/UL — SIGNIFICANT CHANGE UP (ref 1.2–3.4)
LYMPHOCYTES # BLD AUTO: 18.1 % — LOW (ref 20.5–51.1)
MAGNESIUM SERPL-MCNC: 2.1 MG/DL — SIGNIFICANT CHANGE UP (ref 1.8–2.4)
MCHC RBC-ENTMCNC: 28.8 PG — SIGNIFICANT CHANGE UP (ref 27–31)
MCHC RBC-ENTMCNC: 35.5 G/DL — SIGNIFICANT CHANGE UP (ref 32–37)
MCV RBC AUTO: 81.3 FL — SIGNIFICANT CHANGE UP (ref 80–94)
MONOCYTES # BLD AUTO: 0.55 K/UL — SIGNIFICANT CHANGE UP (ref 0.1–0.6)
MONOCYTES NFR BLD AUTO: 6.8 % — SIGNIFICANT CHANGE UP (ref 1.7–9.3)
NEUTROPHILS # BLD AUTO: 5.95 K/UL — SIGNIFICANT CHANGE UP (ref 1.4–6.5)
NEUTROPHILS NFR BLD AUTO: 74.2 % — SIGNIFICANT CHANGE UP (ref 42.2–75.2)
NRBC # BLD: 0 /100 WBCS — SIGNIFICANT CHANGE UP (ref 0–0)
PLATELET # BLD AUTO: 262 K/UL — SIGNIFICANT CHANGE UP (ref 130–400)
POTASSIUM SERPL-MCNC: 3.8 MMOL/L — SIGNIFICANT CHANGE UP (ref 3.5–5)
POTASSIUM SERPL-SCNC: 3.8 MMOL/L — SIGNIFICANT CHANGE UP (ref 3.5–5)
PROT SERPL-MCNC: 7.2 G/DL — SIGNIFICANT CHANGE UP (ref 6–8)
RBC # BLD: 5.55 M/UL — SIGNIFICANT CHANGE UP (ref 4.7–6.1)
RBC # FLD: 12.1 % — SIGNIFICANT CHANGE UP (ref 11.5–14.5)
SODIUM SERPL-SCNC: 141 MMOL/L — SIGNIFICANT CHANGE UP (ref 135–146)
TROPONIN T SERPL-MCNC: <0.01 NG/ML — SIGNIFICANT CHANGE UP
WBC # BLD: 8.03 K/UL — SIGNIFICANT CHANGE UP (ref 4.8–10.8)
WBC # FLD AUTO: 8.03 K/UL — SIGNIFICANT CHANGE UP (ref 4.8–10.8)

## 2021-04-05 PROCEDURE — 99285 EMERGENCY DEPT VISIT HI MDM: CPT

## 2021-04-05 PROCEDURE — 71046 X-RAY EXAM CHEST 2 VIEWS: CPT | Mod: 26

## 2021-04-05 RX ORDER — SUCRALFATE 1 G
1 TABLET ORAL
Qty: 56 | Refills: 0
Start: 2021-04-05 | End: 2021-04-18

## 2021-04-05 RX ORDER — FAMOTIDINE 10 MG/ML
20 INJECTION INTRAVENOUS ONCE
Refills: 0 | Status: COMPLETED | OUTPATIENT
Start: 2021-04-05 | End: 2021-04-05

## 2021-04-05 RX ORDER — DIPHENHYDRAMINE HYDROCHLORIDE AND LIDOCAINE HYDROCHLORIDE AND ALUMINUM HYDROXIDE AND MAGNESIUM HYDRO
30 KIT ONCE
Refills: 0 | Status: COMPLETED | OUTPATIENT
Start: 2021-04-05 | End: 2021-04-05

## 2021-04-05 RX ADMIN — FAMOTIDINE 100 MILLIGRAM(S): 10 INJECTION INTRAVENOUS at 19:50

## 2021-04-05 RX ADMIN — DIPHENHYDRAMINE HYDROCHLORIDE AND LIDOCAINE HYDROCHLORIDE AND ALUMINUM HYDROXIDE AND MAGNESIUM HYDRO 30 MILLILITER(S): KIT at 19:51

## 2021-04-05 NOTE — ED PROVIDER NOTE - CLINICAL SUMMARY MEDICAL DECISION MAKING FREE TEXT BOX
Pt with  hx  esophagitis,  gastritis and hiatal hernia  dxd 2 months ago on EGD  pw  recurrence of his pain  over past 1 week no change in stool color abd soft mild tenderness,  labs reviewed pt feels much better after sxatic treatment -   Patient to be discharged from ED in well appearing condition. Any available test results were discussed with and printed  for patient.  Verbal instructions given, including instructions to return to ED immediately for any new, worsening, or concerning symptoms. Limitations of ED work up discussed.  Patient reports understanding of above with capacity and insight. Written discharge instructions additionally given, including follow-up plan.

## 2021-04-05 NOTE — ED PROVIDER NOTE - NS ED ROS FT
Constitutional: (-) fever  Eyes/ENT: (-) blurry vision, (-) epistaxis  Cardiovascular: (+) chest pain, (-) syncope  Respiratory: (-) cough, (-) shortness of breath  Gastrointestinal: (-) vomiting, (-) diarrhea (+) abdominal pain (-) inability to pass gas   Musculoskeletal: (-) neck pain, (-) back pain, (-) joint pain  Integumentary: (-) rash, (-) edema  Neurological: (-) headache, (-) altered mental status  Psychiatric: (-) hallucinations  Allergic/Immunologic: (-) pruritus Constitutional: (-) fever (-) chills   Eyes/ENT: (-) blurry vision, (-) epistaxis  Cardiovascular: (+) chest pain, (-) syncope  Respiratory: (-) cough, (-) shortness of breath  Gastrointestinal: (-) vomiting, (-) diarrhea (+) abdominal pain (-) inability to pass gas   Musculoskeletal: (-) neck pain, (-) back pain, (-) joint pain  Integumentary: (-) rash, (-) edema  Neurological: (-) headache, (-) altered mental status  Psychiatric: (-) hallucinations  Allergic/Immunologic: (-) pruritus

## 2021-04-05 NOTE — ED ADULT NURSE NOTE - PMH
Anxiety    Hypertension    Palpitations    Sinusitis    Vertigo     Anxiety    Hernia    Hypertension    Palpitations    Sinusitis    Vertigo

## 2021-04-05 NOTE — ED PROVIDER NOTE - OBJECTIVE STATEMENT
30 y.o. male pmh of hiatal hernia presenting for worsened abdominal pain with occassional radiation up left side of chest. This pain has occurred for past 4 months, had endoscopy done one month ago by GI Dr. Walters. He is aware of discomfort and recommended  daily omeprazole. Pt confirms compliance with meds. Denies trauma, N/V, diarrhea, inability ot pass gas, back pain, cough, hemoptysis.

## 2021-04-05 NOTE — ED PROVIDER NOTE - PHYSICAL EXAMINATION
Physical Exam    Vital Signs: I have reviewed the initial vital signs.  Constitutional: well-nourished, appears stated age, no acute distress  Eyes: Conjunctiva pink, Sclera clear, PERRLA, EOMI.  Cardiovascular: S1 and S2, regular rate, regular rhythm, well-perfused extremities, radial pulses equal and 2+  Respiratory: unlabored respiratory effort, clear to auscultation bilaterally no wheezing, rales and rhonchi  Gastrointestinal: soft, non-tender abdomen, no pulsatile mass, normal bowl sounds  Integumentary: warm, dry, no rash  Neurologic: awake, alert,

## 2021-04-05 NOTE — ED PROVIDER NOTE - NSFOLLOWUPINSTRUCTIONS_ED_ALL_ED_FT
Follow up with your primary care doctor and your GI doctor in 1-2 days    Acute Abdominal Pain    WHAT YOU NEED TO KNOW:    The cause of your abdominal pain may not be found. If a cause is found, treatment will depend on what the cause is.     DISCHARGE INSTRUCTIONS:    Return to the emergency department if:     You vomit blood or cannot stop vomiting.      You have blood in your bowel movement or it looks like tar.       You have bleeding from your rectum.       Your abdomen is larger than usual, more painful, and hard.       You have severe pain in your abdomen.       You stop passing gas and having bowel movements.       You feel weak, dizzy, or faint.    Contact your healthcare provider if:     You have a fever.      You have new signs and symptoms.      Your symptoms do not get better with treatment.       You have questions or concerns about your condition or care.    Medicines may be given to decrease pain, treat an infection, and manage your symptoms. Take your medicine as directed. Call your healthcare provider if you think your medicine is not helping or if you have side effects. Tell him if you are allergic to any medicine. Keep a list of the medicines, vitamins, and herbs you take. Include the amounts, and when and why you take them. Bring the list or the pill bottles to follow-up visits. Carry your medicine list with you in case of an emergency.    Manage your symptoms:     Apply heat on your abdomen for 20 to 30 minutes every 2 hours for as many days as directed. Heat helps decrease pain and muscle spasms.       Manage your stress. Stress may cause abdominal pain. Your healthcare provider may recommend relaxation techniques and deep breathing exercises to help decrease your stress. Your healthcare provider may recommend you talk to someone about your stress or anxiety, such as a counselor or a trusted friend. Get plenty of sleep and exercise regularly.       Limit or do not drink alcohol. Alcohol can make your abdominal pain worse. Ask your healthcare provider if it is safe for you to drink alcohol. Also ask how much is safe for you to drink.       Do not smoke. Nicotine and other chemicals in cigarettes can damage your esophagus and stomach. Ask your healthcare provider for information if you currently smoke and need help to quit. E-cigarettes or smokeless tobacco still contain nicotine. Talk to your healthcare provider before you use these products.     Make changes to the food you eat as directed: Do not eat foods that cause abdominal pain or other symptoms. Eat small meals more often.     Eat more high-fiber foods if you are constipated. High-fiber foods include fruits, vegetables, whole-grain foods, and legumes.       Do not eat foods that cause gas if you have bloating. Examples include broccoli, cabbage, and cauliflower. Do not drink soda or carbonated drinks, because these may also cause gas.       Do not eat foods or drinks that contain sorbitol or fructose if you have diarrhea and bloating. Some examples are fruit juices, candy, jelly, and sugar-free gum.       Do not eat high-fat foods, such as fried foods, cheeseburgers, hot dogs, and desserts.      Limit or do not drink caffeine. Caffeine may make symptoms, such as heart burn or nausea, worse.       Drink plenty of liquids to prevent dehydration from diarrhea or vomiting. Ask your healthcare provider how much liquid to drink each day and which liquids are best for you.     Follow up with your healthcare provider as directed: Write down your questions so you remember to ask them during your visits.       © Copyright [employer] 2019 All illustrations and images included in CareNotes are the copyrighted property of [employer] Inc. or [employer]

## 2021-04-05 NOTE — ED PROVIDER NOTE - PATIENT PORTAL LINK FT
You can access the FollowMyHealth Patient Portal offered by [facility] by registering at the following website: [email or web address]. By joining [employer]’s FollowMyHealth portal, you will also be able to view your health information using other applications (apps) compatible with our system.

## 2021-04-08 ENCOUNTER — APPOINTMENT (OUTPATIENT)
Dept: CARDIOLOGY | Facility: CLINIC | Age: 31
End: 2021-04-08

## 2021-04-12 ENCOUNTER — OUTPATIENT (OUTPATIENT)
Dept: OUTPATIENT SERVICES | Facility: HOSPITAL | Age: 31
LOS: 1 days | Discharge: HOME | End: 2021-04-12
Payer: MEDICAID

## 2021-04-12 DIAGNOSIS — K44.9 DIAPHRAGMATIC HERNIA WITHOUT OBSTRUCTION OR GANGRENE: ICD-10-CM

## 2021-04-12 DIAGNOSIS — R07.9 CHEST PAIN, UNSPECIFIED: ICD-10-CM

## 2021-04-12 DIAGNOSIS — K21.9 GASTRO-ESOPHAGEAL REFLUX DISEASE WITHOUT ESOPHAGITIS: ICD-10-CM

## 2021-04-12 DIAGNOSIS — R10.9 UNSPECIFIED ABDOMINAL PAIN: ICD-10-CM

## 2021-04-12 PROBLEM — K46.9 UNSPECIFIED ABDOMINAL HERNIA WITHOUT OBSTRUCTION OR GANGRENE: Chronic | Status: ACTIVE | Noted: 2021-04-05

## 2021-04-12 PROCEDURE — 74220 X-RAY XM ESOPHAGUS 1CNTRST: CPT | Mod: 26

## 2021-04-12 PROCEDURE — 74018 RADEX ABDOMEN 1 VIEW: CPT | Mod: 26

## 2021-04-13 ENCOUNTER — EMERGENCY (EMERGENCY)
Facility: HOSPITAL | Age: 31
LOS: 0 days | Discharge: HOME | End: 2021-04-13
Attending: EMERGENCY MEDICINE | Admitting: EMERGENCY MEDICINE
Payer: MEDICAID

## 2021-04-13 VITALS
TEMPERATURE: 98 F | OXYGEN SATURATION: 100 % | WEIGHT: 184.97 LBS | HEIGHT: 70 IN | SYSTOLIC BLOOD PRESSURE: 141 MMHG | DIASTOLIC BLOOD PRESSURE: 85 MMHG | HEART RATE: 100 BPM | RESPIRATION RATE: 18 BRPM

## 2021-04-13 VITALS
RESPIRATION RATE: 18 BRPM | SYSTOLIC BLOOD PRESSURE: 129 MMHG | DIASTOLIC BLOOD PRESSURE: 79 MMHG | HEART RATE: 83 BPM | OXYGEN SATURATION: 96 %

## 2021-04-13 DIAGNOSIS — R07.89 OTHER CHEST PAIN: ICD-10-CM

## 2021-04-13 DIAGNOSIS — F41.9 ANXIETY DISORDER, UNSPECIFIED: ICD-10-CM

## 2021-04-13 DIAGNOSIS — Z82.49 FAMILY HISTORY OF ISCHEMIC HEART DISEASE AND OTHER DISEASES OF THE CIRCULATORY SYSTEM: ICD-10-CM

## 2021-04-13 DIAGNOSIS — M54.9 DORSALGIA, UNSPECIFIED: ICD-10-CM

## 2021-04-13 DIAGNOSIS — K21.9 GASTRO-ESOPHAGEAL REFLUX DISEASE WITHOUT ESOPHAGITIS: ICD-10-CM

## 2021-04-13 DIAGNOSIS — Z79.899 OTHER LONG TERM (CURRENT) DRUG THERAPY: ICD-10-CM

## 2021-04-13 DIAGNOSIS — R07.9 CHEST PAIN, UNSPECIFIED: ICD-10-CM

## 2021-04-13 DIAGNOSIS — I10 ESSENTIAL (PRIMARY) HYPERTENSION: ICD-10-CM

## 2021-04-13 PROCEDURE — 71046 X-RAY EXAM CHEST 2 VIEWS: CPT | Mod: 26

## 2021-04-13 PROCEDURE — 99284 EMERGENCY DEPT VISIT MOD MDM: CPT

## 2021-04-13 NOTE — ED PROVIDER NOTE - ATTENDING CONTRIBUTION TO CARE
30y male above pmh, no cardiac or PE RF, with CP as above, also admits to "pulling something' in his chest, on exam vital signs appreciated, anxious, nontoxic, head nc/at, perrla, EOMI, conj pink op clear neck supple cor rrr lungs cta abd snt +ttp left pectoral muscle no c/c/e pulses equal calves nontender neuro intact, ekg and imaging reviewed, pt reassured, supportive care, GI f/u. Patient counseled regarding conditions which should prompt return.

## 2021-04-13 NOTE — ED PROVIDER NOTE - OBJECTIVE STATEMENT
31 y/o male with hx of esophagitis, GERD recently dx by endoscopy by GI two months ago, anxiety, tachycardia presents to the ED with left sided chest and back pain after painful belching last night. patient denies any SOB, diaphoresis, palpitations. patient seen in the ED one week ago for similar complaints, given carafate which he has not filled yet. patient denies any drugs or alcohol abuse. patient with cardiac family hx in mother . patient denies any syncope, neck or back pain, weakness to extremities , or calf pain .

## 2021-04-13 NOTE — ED PROVIDER NOTE - NS ED ROS FT
Review of Systems    Constitutional: (-) fever/ chills   ENT: (-) epistaxis (-) sore throat (-) ear pain  Cardiovascular: (+) chest pain, (-) syncope (-) palpitations  Respiratory: (-) cough, (-) shortness of breath  Gastrointestinal: (-) vomiting, (-) diarrhea (-) abdominal pain  Musculoskeletal:  (-) back pain, (-) joint pain   Integumentary: (-) rash, (-) swelling  Neurological: (-) headache, (-) altered mental status

## 2021-04-13 NOTE — ED PROVIDER NOTE - PROGRESS NOTE DETAILS
bedisde ultrasound echo - no effusion, good ef. no right heart strain bedisde ultrasound echo preformed due to chest pain - no effusion, good ef. no right heart strain

## 2021-04-13 NOTE — ED PROCEDURE NOTE - ATTENDING CONTRIBUTION TO CARE
I was present for and supervised the key and critical aspects of the procedures performed during the care of the patient.  I personally supervised the study.  I reviewed the images and interpretation by the resident/ACP and have edited where appropriate.

## 2021-04-13 NOTE — ED PROVIDER NOTE - PHYSICAL EXAMINATION
Vital Signs: I have reviewed the initial vital signs.  Constitutional: anxious, no acute distress, normocephalic  Eyes: PERRLA, EOMI, clear conjunctiva  ENT: MMM, TM b/l clear , no nasal congestion  Cardiovascular: regular rate, regular rhythm, no murmur appreciated, no chest wall tenderness  Respiratory: unlabored respiratory effort, clear to auscultation bilaterally  Gastrointestinal: soft, non-tender, non-distended  abdomen, no pulsatile mass  Musculoskeletal: supple neck, no lower extremity tenderness or swelling  Integumentary: warm, dry, no rash  Neurologic: awake, alert, cranial nerves II-XII grossly intact, extremities’ motor and sensory functions grossly intact, no focal deficits, GCS 15

## 2021-04-13 NOTE — ED PROVIDER NOTE - PATIENT PORTAL LINK FT
You can access the FollowMyHealth Patient Portal offered by [facility] by registering at the following website: [email or web address]. By joining [employer]’s FollowMyHealth portal, you will also be able to view your health information using other applications (apps) compatible with our system.

## 2021-05-03 ENCOUNTER — EMERGENCY (EMERGENCY)
Facility: HOSPITAL | Age: 31
LOS: 0 days | Discharge: HOME | End: 2021-05-03
Attending: EMERGENCY MEDICINE | Admitting: EMERGENCY MEDICINE
Payer: MEDICAID

## 2021-05-03 VITALS
HEART RATE: 83 BPM | RESPIRATION RATE: 20 BRPM | OXYGEN SATURATION: 98 % | WEIGHT: 182.1 LBS | DIASTOLIC BLOOD PRESSURE: 83 MMHG | SYSTOLIC BLOOD PRESSURE: 134 MMHG | HEIGHT: 70 IN | TEMPERATURE: 97 F

## 2021-05-03 VITALS
HEART RATE: 84 BPM | SYSTOLIC BLOOD PRESSURE: 125 MMHG | DIASTOLIC BLOOD PRESSURE: 84 MMHG | OXYGEN SATURATION: 100 % | RESPIRATION RATE: 18 BRPM

## 2021-05-03 DIAGNOSIS — K21.9 GASTRO-ESOPHAGEAL REFLUX DISEASE WITHOUT ESOPHAGITIS: ICD-10-CM

## 2021-05-03 DIAGNOSIS — R07.89 OTHER CHEST PAIN: ICD-10-CM

## 2021-05-03 DIAGNOSIS — R06.02 SHORTNESS OF BREATH: ICD-10-CM

## 2021-05-03 DIAGNOSIS — Z79.899 OTHER LONG TERM (CURRENT) DRUG THERAPY: ICD-10-CM

## 2021-05-03 LAB
ALBUMIN SERPL ELPH-MCNC: 5.1 G/DL — SIGNIFICANT CHANGE UP (ref 3.5–5.2)
ALP SERPL-CCNC: 81 U/L — SIGNIFICANT CHANGE UP (ref 30–115)
ALT FLD-CCNC: 19 U/L — SIGNIFICANT CHANGE UP (ref 0–41)
ANION GAP SERPL CALC-SCNC: 10 MMOL/L — SIGNIFICANT CHANGE UP (ref 7–14)
AST SERPL-CCNC: 18 U/L — SIGNIFICANT CHANGE UP (ref 0–41)
BASOPHILS # BLD AUTO: 0.01 K/UL — SIGNIFICANT CHANGE UP (ref 0–0.2)
BASOPHILS NFR BLD AUTO: 0.1 % — SIGNIFICANT CHANGE UP (ref 0–1)
BILIRUB SERPL-MCNC: 0.6 MG/DL — SIGNIFICANT CHANGE UP (ref 0.2–1.2)
BUN SERPL-MCNC: 10 MG/DL — SIGNIFICANT CHANGE UP (ref 10–20)
CALCIUM SERPL-MCNC: 9.8 MG/DL — SIGNIFICANT CHANGE UP (ref 8.5–10.1)
CHLORIDE SERPL-SCNC: 102 MMOL/L — SIGNIFICANT CHANGE UP (ref 98–110)
CO2 SERPL-SCNC: 28 MMOL/L — SIGNIFICANT CHANGE UP (ref 17–32)
CREAT SERPL-MCNC: 1 MG/DL — SIGNIFICANT CHANGE UP (ref 0.7–1.5)
EOSINOPHIL # BLD AUTO: 0.04 K/UL — SIGNIFICANT CHANGE UP (ref 0–0.7)
EOSINOPHIL NFR BLD AUTO: 0.5 % — SIGNIFICANT CHANGE UP (ref 0–8)
GLUCOSE SERPL-MCNC: 86 MG/DL — SIGNIFICANT CHANGE UP (ref 70–99)
HCT VFR BLD CALC: 44.9 % — SIGNIFICANT CHANGE UP (ref 42–52)
HGB BLD-MCNC: 15.9 G/DL — SIGNIFICANT CHANGE UP (ref 14–18)
IMM GRANULOCYTES NFR BLD AUTO: 0.3 % — SIGNIFICANT CHANGE UP (ref 0.1–0.3)
LIDOCAIN IGE QN: 23 U/L — SIGNIFICANT CHANGE UP (ref 7–60)
LYMPHOCYTES # BLD AUTO: 1.75 K/UL — SIGNIFICANT CHANGE UP (ref 1.2–3.4)
LYMPHOCYTES # BLD AUTO: 23 % — SIGNIFICANT CHANGE UP (ref 20.5–51.1)
MAGNESIUM SERPL-MCNC: 2.2 MG/DL — SIGNIFICANT CHANGE UP (ref 1.8–2.4)
MCHC RBC-ENTMCNC: 28.9 PG — SIGNIFICANT CHANGE UP (ref 27–31)
MCHC RBC-ENTMCNC: 35.4 G/DL — SIGNIFICANT CHANGE UP (ref 32–37)
MCV RBC AUTO: 81.6 FL — SIGNIFICANT CHANGE UP (ref 80–94)
MONOCYTES # BLD AUTO: 0.49 K/UL — SIGNIFICANT CHANGE UP (ref 0.1–0.6)
MONOCYTES NFR BLD AUTO: 6.4 % — SIGNIFICANT CHANGE UP (ref 1.7–9.3)
NEUTROPHILS # BLD AUTO: 5.3 K/UL — SIGNIFICANT CHANGE UP (ref 1.4–6.5)
NEUTROPHILS NFR BLD AUTO: 69.7 % — SIGNIFICANT CHANGE UP (ref 42.2–75.2)
NRBC # BLD: 0 /100 WBCS — SIGNIFICANT CHANGE UP (ref 0–0)
NT-PROBNP SERPL-SCNC: 11 PG/ML — SIGNIFICANT CHANGE UP (ref 0–300)
PLATELET # BLD AUTO: 263 K/UL — SIGNIFICANT CHANGE UP (ref 130–400)
POTASSIUM SERPL-MCNC: 3.8 MMOL/L — SIGNIFICANT CHANGE UP (ref 3.5–5)
POTASSIUM SERPL-SCNC: 3.8 MMOL/L — SIGNIFICANT CHANGE UP (ref 3.5–5)
PROT SERPL-MCNC: 7.5 G/DL — SIGNIFICANT CHANGE UP (ref 6–8)
RBC # BLD: 5.5 M/UL — SIGNIFICANT CHANGE UP (ref 4.7–6.1)
RBC # FLD: 12.5 % — SIGNIFICANT CHANGE UP (ref 11.5–14.5)
SODIUM SERPL-SCNC: 140 MMOL/L — SIGNIFICANT CHANGE UP (ref 135–146)
TROPONIN T SERPL-MCNC: <0.01 NG/ML — SIGNIFICANT CHANGE UP
WBC # BLD: 7.61 K/UL — SIGNIFICANT CHANGE UP (ref 4.8–10.8)
WBC # FLD AUTO: 7.61 K/UL — SIGNIFICANT CHANGE UP (ref 4.8–10.8)

## 2021-05-03 PROCEDURE — 99285 EMERGENCY DEPT VISIT HI MDM: CPT

## 2021-05-03 PROCEDURE — 71046 X-RAY EXAM CHEST 2 VIEWS: CPT | Mod: 26

## 2021-05-03 RX ORDER — FAMOTIDINE 10 MG/ML
20 INJECTION INTRAVENOUS ONCE
Refills: 0 | Status: COMPLETED | OUTPATIENT
Start: 2021-05-03 | End: 2021-05-03

## 2021-05-03 RX ADMIN — FAMOTIDINE 20 MILLIGRAM(S): 10 INJECTION INTRAVENOUS at 18:01

## 2021-05-03 NOTE — ED PROVIDER NOTE - PATIENT PORTAL LINK FT
You can access the FollowMyHealth Patient Portal offered by [facility] by registering at the following website: [email or web address]. By joining [employer]’s FollowMyHealth portal, you will also be able to view your health information using other applications (apps) compatible with our system.

## 2021-05-03 NOTE — ED ADULT TRIAGE NOTE - CHIEF COMPLAINT QUOTE
"I am having chest pain for a couple of days, I was here about last month for the same thing. My MD told me to come in"

## 2021-05-03 NOTE — ED PROVIDER NOTE - CLINICAL SUMMARY MEDICAL DECISION MAKING FREE TEXT BOX
pt presented for chest pain, (+) hx esophagitis, follows up with gi, also follows with cardiology Dr. Brown, is supposed to get outpt ccta but pt wanted to see if he can get it done in ed, aware of all results, no symptoms at this time, tolerating po, understands follow up with pmd, gi, and cardiology to get ccta, comfortable with plan, copy of results provided, aware of signs and symptoms to return for, will follow up.

## 2021-05-03 NOTE — ED PROVIDER NOTE - OBJECTIVE STATEMENT
Pt is a 29y/o male with a pmhx of GERD, esophagitis presents today for eval of intermittent left sided chest discomfort with no aggravating/alleviating factors x 2 days. Pt sts he has had these episodes in th epast and follows with Dr. Brown. Pt denies SOB, abd pain, n/v/d, fever, chills, leg swelling.

## 2021-05-03 NOTE — ED PROVIDER NOTE - NSFOLLOWUPINSTRUCTIONS_ED_ALL_ED_FT

## 2021-05-03 NOTE — ED PROVIDER NOTE - PHYSICAL EXAMINATION
VITAL SIGNS: I have reviewed nursing notes and confirm.  CONSTITUTIONAL: Well-developed; well-nourished; in no acute distress.   SKIN: skin exam is warm and dry, no acute rash.    HEAD: Normocephalic; atraumatic.  EYES:  conjunctiva and sclera clear.  ENT: No nasal discharge; airway clear.  CARD: S1, S2 normal; no murmurs, gallops, or rubs. Regular rate and rhythm.   RESP: No wheezes, rales or rhonchi.  ABD: Normal bowel sounds; soft; non-distended; non-tender  EXT: Normal ROM.  No clubbing, cyanosis or edema. .  NEURO: Alert, oriented, grossly unremarkable

## 2021-05-03 NOTE — ED PROVIDER NOTE - ATTENDING CONTRIBUTION TO CARE
31 y/o m w/ pmhx of esophagitis, weight loss secondary to this, gerd, follow with GI. Dr. Walters (last was on Friday, endoscopy showing esophagitis in March (on lansoprazole) presents with tightness to mid-chest since yesterday, feels in more in throat, associated with tightness/sob on exertion, constant, non-radiating, persisted today. called his cardiologist Dr. adkins who told pt he will be scheduled for ccta, needs lab work first, pt did not want to wait until appointment was made so came to ed. not realizing the ccta does not get done at [facility]. no family hx of cardiac issues at his age, mom had cardiac issues in her 50's, no family hx of PE/DVT/clotting disorders. No fever, chills, n/v, pleuritic cp, palpitations, diaphoresis, sore throat, trismus, drooling/secretions, cough, ha/lh/dizziness, numbness/tingling, neck pain/ stiffness, abd pain, diarrhea, constipation, melena/brbpr, urinary symptoms, trauma, weakness, edema, calf pain/swelling/erythema, sick contacts, recent travel or rash. (-) smoker.    On exam: Well-developed; well-nourished male/, non-toxic appearing, speaking full sentences, in no acute distress.  no sniffing position, no hot potato/ muffled voice. No rash. PERRL, conjunctiva and sclera clear. No injection, discharge or pallor. No rhinorrhea. MMM, No erythema, exudates or petechiae. Uvula midline. No drooling/secretions, no strawberry tongue,  No PTA. No trismus. No malocclusion. No TMJ pain. No elevation of floor of mouth/ no pain to palpation of floor of mouth. No oropharyngeal edema. No anterior neck pain. Neck supple, no meningeal signs, no torticollis. RRR. Radial pulses 2/4 /bl.  No pain to palpation to chest wall, Cap refill < 2 seconds. No congestion. Breaths sounds present b/l. CTABL. No wheezes or crackles. Good air exchange. No accessory muscle use/retractions. No stridor. BS present throughout all 4 quadrants; soft; non-distended; non-tender; no rebound tenderness/guarding, no cvat, no hepatosplenomegaly. No palpable masses. Moving all ext. No acute LAD. AAOx3. CN II-XII intact, no facial droop or slurring of speech. no focal deficits. 29 y/o m w/ pmhx of esophagitis, weight loss secondary to this, gerd, follow with GI. Dr. Walters (last was on Friday, endoscopy showing esophagitis in March (on lansoprazole) presents with tightness to mid-chest since yesterday, feels in more in throat, associated with tightness/sob on exertion, constant, non-radiating, persisted today. called his cardiologist Dr. Brown who told pt he will be scheduled for ccta, needs lab work first, pt did not want to wait until appointment was made so came to ed. not realizing the ccta does not get done at [facility]. no family hx of cardiac issues at his age, mom had cardiac issues in her 50's, no family hx of PE/DVT/clotting disorders. No fever, chills, n/v, pleuritic cp, palpitations, diaphoresis, sore throat, trismus, drooling/secretions, cough, ha/lh/dizziness, numbness/tingling, neck pain/ stiffness, abd pain, diarrhea, constipation, melena/brbpr, urinary symptoms, trauma, weakness, edema, calf pain/swelling/erythema, sick contacts, recent travel or rash. (-) smoker.    On exam: Well-developed; well-nourished male/, non-toxic appearing, speaking full sentences, in no acute distress.  no sniffing position, no hot potato/ muffled voice. No rash. PERRL, conjunctiva and sclera clear. No injection, discharge or pallor. No rhinorrhea. MMM, No erythema, exudates or petechiae. Uvula midline. No drooling/secretions, no strawberry tongue,  No PTA. No trismus. No malocclusion. No TMJ pain. No elevation of floor of mouth/ no pain to palpation of floor of mouth. No oropharyngeal edema. No anterior neck pain. Neck supple, no meningeal signs, no torticollis. RRR. Radial pulses 2/4 /bl.  No pain to palpation to chest wall, Cap refill < 2 seconds. No congestion. Breaths sounds present b/l. CTABL. No wheezes or crackles. Good air exchange. No accessory muscle use/retractions. No stridor. BS present throughout all 4 quadrants; soft; non-distended; non-tender; no rebound tenderness/guarding, no cvat, no hepatosplenomegaly. No palpable masses. Moving all ext. No acute LAD. AAOx3. CN II-XII intact, no facial droop or slurring of speech. no focal deficits.

## 2021-05-03 NOTE — ED PROVIDER NOTE - CARE PLAN
Assessment and plan of treatment:	Plan: Monitor, EKG, CXR, labs, pepcid, reassess.   Principal Discharge DX:	Chest pain  Assessment and plan of treatment:	Plan: Monitor, EKG, CXR, labs, pepcid, reassess.

## 2021-05-03 NOTE — ED PROVIDER NOTE - PROGRESS NOTE DETAILS
workup neg, pt will f/u with Dr. Brown, pt agreebale with  plan, will dc ED Attending VANDANA Huston   pt reports no symptoms at this time, aware of results. Shared decision making utilized and HEART score discussed.  Patient electing for outpatient management and will follow up immediately with cardiology. eliceo schedule for his ccta. Risk of MACE discussed and patient understands this risk. Strict return precautions discussed and patient aware they can return at any time for re-evaluation and possible admission. EKG and results discussed.

## 2021-05-03 NOTE — ED PROVIDER NOTE - CARE PROVIDER_API CALL
Luis Brown)  Cardiovascular Disease; Internal Medicine  [address] [place] [address]  Phone: [phone]  Fax: [phone]  Follow Up Time:

## 2021-05-14 ENCOUNTER — EMERGENCY (EMERGENCY)
Facility: HOSPITAL | Age: 31
LOS: 0 days | Discharge: HOME | End: 2021-05-15
Attending: EMERGENCY MEDICINE | Admitting: EMERGENCY MEDICINE
Payer: MEDICAID

## 2021-05-14 VITALS
OXYGEN SATURATION: 98 % | DIASTOLIC BLOOD PRESSURE: 85 MMHG | TEMPERATURE: 99 F | SYSTOLIC BLOOD PRESSURE: 137 MMHG | RESPIRATION RATE: 18 BRPM | HEART RATE: 104 BPM | WEIGHT: 182.1 LBS | HEIGHT: 70 IN

## 2021-05-14 DIAGNOSIS — R00.2 PALPITATIONS: ICD-10-CM

## 2021-05-14 DIAGNOSIS — K20.90 ESOPHAGITIS, UNSPECIFIED WITHOUT BLEEDING: ICD-10-CM

## 2021-05-14 DIAGNOSIS — F41.9 ANXIETY DISORDER, UNSPECIFIED: ICD-10-CM

## 2021-05-14 DIAGNOSIS — R07.9 CHEST PAIN, UNSPECIFIED: ICD-10-CM

## 2021-05-14 DIAGNOSIS — J32.9 CHRONIC SINUSITIS, UNSPECIFIED: ICD-10-CM

## 2021-05-14 DIAGNOSIS — Z82.49 FAMILY HISTORY OF ISCHEMIC HEART DISEASE AND OTHER DISEASES OF THE CIRCULATORY SYSTEM: ICD-10-CM

## 2021-05-14 DIAGNOSIS — I10 ESSENTIAL (PRIMARY) HYPERTENSION: ICD-10-CM

## 2021-05-14 DIAGNOSIS — R42 DIZZINESS AND GIDDINESS: ICD-10-CM

## 2021-05-14 LAB
ALBUMIN SERPL ELPH-MCNC: 4.9 G/DL — SIGNIFICANT CHANGE UP (ref 3.5–5.2)
ALP SERPL-CCNC: 74 U/L — SIGNIFICANT CHANGE UP (ref 30–115)
ALT FLD-CCNC: 14 U/L — SIGNIFICANT CHANGE UP (ref 0–41)
ANION GAP SERPL CALC-SCNC: 13 MMOL/L — SIGNIFICANT CHANGE UP (ref 7–14)
AST SERPL-CCNC: 15 U/L — SIGNIFICANT CHANGE UP (ref 0–41)
BASOPHILS # BLD AUTO: 0.01 K/UL — SIGNIFICANT CHANGE UP (ref 0–0.2)
BASOPHILS NFR BLD AUTO: 0.1 % — SIGNIFICANT CHANGE UP (ref 0–1)
BILIRUB SERPL-MCNC: 0.9 MG/DL — SIGNIFICANT CHANGE UP (ref 0.2–1.2)
BUN SERPL-MCNC: 11 MG/DL — SIGNIFICANT CHANGE UP (ref 10–20)
CALCIUM SERPL-MCNC: 9.8 MG/DL — SIGNIFICANT CHANGE UP (ref 8.5–10.1)
CHLORIDE SERPL-SCNC: 102 MMOL/L — SIGNIFICANT CHANGE UP (ref 98–110)
CO2 SERPL-SCNC: 25 MMOL/L — SIGNIFICANT CHANGE UP (ref 17–32)
CREAT SERPL-MCNC: 1 MG/DL — SIGNIFICANT CHANGE UP (ref 0.7–1.5)
D DIMER BLD IA.RAPID-MCNC: 43 NG/ML DDU — SIGNIFICANT CHANGE UP (ref 0–230)
EOSINOPHIL # BLD AUTO: 0.01 K/UL — SIGNIFICANT CHANGE UP (ref 0–0.7)
EOSINOPHIL NFR BLD AUTO: 0.1 % — SIGNIFICANT CHANGE UP (ref 0–8)
GLUCOSE SERPL-MCNC: 100 MG/DL — HIGH (ref 70–99)
HCT VFR BLD CALC: 44.7 % — SIGNIFICANT CHANGE UP (ref 42–52)
HGB BLD-MCNC: 15.6 G/DL — SIGNIFICANT CHANGE UP (ref 14–18)
IMM GRANULOCYTES NFR BLD AUTO: 0.3 % — SIGNIFICANT CHANGE UP (ref 0.1–0.3)
LYMPHOCYTES # BLD AUTO: 0.65 K/UL — LOW (ref 1.2–3.4)
LYMPHOCYTES # BLD AUTO: 8.6 % — LOW (ref 20.5–51.1)
MCHC RBC-ENTMCNC: 28.4 PG — SIGNIFICANT CHANGE UP (ref 27–31)
MCHC RBC-ENTMCNC: 34.9 G/DL — SIGNIFICANT CHANGE UP (ref 32–37)
MCV RBC AUTO: 81.3 FL — SIGNIFICANT CHANGE UP (ref 80–94)
MONOCYTES # BLD AUTO: 0.73 K/UL — HIGH (ref 0.1–0.6)
MONOCYTES NFR BLD AUTO: 9.7 % — HIGH (ref 1.7–9.3)
NEUTROPHILS # BLD AUTO: 6.1 K/UL — SIGNIFICANT CHANGE UP (ref 1.4–6.5)
NEUTROPHILS NFR BLD AUTO: 81.2 % — HIGH (ref 42.2–75.2)
NRBC # BLD: 0 /100 WBCS — SIGNIFICANT CHANGE UP (ref 0–0)
PLATELET # BLD AUTO: 222 K/UL — SIGNIFICANT CHANGE UP (ref 130–400)
POTASSIUM SERPL-MCNC: 4.6 MMOL/L — SIGNIFICANT CHANGE UP (ref 3.5–5)
POTASSIUM SERPL-SCNC: 4.6 MMOL/L — SIGNIFICANT CHANGE UP (ref 3.5–5)
PROT SERPL-MCNC: 7.2 G/DL — SIGNIFICANT CHANGE UP (ref 6–8)
RBC # BLD: 5.5 M/UL — SIGNIFICANT CHANGE UP (ref 4.7–6.1)
RBC # FLD: 12.4 % — SIGNIFICANT CHANGE UP (ref 11.5–14.5)
SODIUM SERPL-SCNC: 140 MMOL/L — SIGNIFICANT CHANGE UP (ref 135–146)
TROPONIN T SERPL-MCNC: <0.01 NG/ML — SIGNIFICANT CHANGE UP
TROPONIN T SERPL-MCNC: <0.01 NG/ML — SIGNIFICANT CHANGE UP
WBC # BLD: 7.52 K/UL — SIGNIFICANT CHANGE UP (ref 4.8–10.8)
WBC # FLD AUTO: 7.52 K/UL — SIGNIFICANT CHANGE UP (ref 4.8–10.8)

## 2021-05-14 PROCEDURE — 93010 ELECTROCARDIOGRAM REPORT: CPT | Mod: 77

## 2021-05-14 PROCEDURE — 99285 EMERGENCY DEPT VISIT HI MDM: CPT

## 2021-05-14 PROCEDURE — 93010 ELECTROCARDIOGRAM REPORT: CPT

## 2021-05-14 PROCEDURE — 71046 X-RAY EXAM CHEST 2 VIEWS: CPT | Mod: 26

## 2021-05-14 RX ORDER — METOPROLOL TARTRATE 50 MG
100 TABLET ORAL ONCE
Refills: 0 | Status: COMPLETED | OUTPATIENT
Start: 2021-05-14 | End: 2021-05-14

## 2021-05-14 RX ORDER — SODIUM CHLORIDE 9 MG/ML
1000 INJECTION INTRAMUSCULAR; INTRAVENOUS; SUBCUTANEOUS ONCE
Refills: 0 | Status: COMPLETED | OUTPATIENT
Start: 2021-05-14 | End: 2021-05-14

## 2021-05-14 RX ADMIN — SODIUM CHLORIDE 1000 MILLILITER(S): 9 INJECTION INTRAMUSCULAR; INTRAVENOUS; SUBCUTANEOUS at 20:15

## 2021-05-14 RX ADMIN — SODIUM CHLORIDE 1000 MILLILITER(S): 9 INJECTION INTRAMUSCULAR; INTRAVENOUS; SUBCUTANEOUS at 15:40

## 2021-05-14 RX ADMIN — Medication 10 MILLIGRAM(S): at 21:43

## 2021-05-14 RX ADMIN — SODIUM CHLORIDE 1000 MILLILITER(S): 9 INJECTION INTRAMUSCULAR; INTRAVENOUS; SUBCUTANEOUS at 13:42

## 2021-05-14 RX ADMIN — Medication 100 MILLIGRAM(S): at 13:42

## 2021-05-14 NOTE — ED CDU PROVIDER INITIAL DAY NOTE - MEDICAL DECISION MAKING DETAILS
Patient presents with chest pain and palpitations. labs, ekg, cxr done. Kevin recommending CCTA, patient placed in obs for further cardiac evaluation.

## 2021-05-14 NOTE — ED PROVIDER NOTE - CLINICAL SUMMARY MEDICAL DECISION MAKING FREE TEXT BOX
pt with CP; found to have rate related EKG changes. Pt with EDOU for further CP eval. Case DW patients cardiologist, Dr Brown. today's sx not new however given this abnormal EKG in the setting of CP will investigate further.

## 2021-05-14 NOTE — ED CDU PROVIDER INITIAL DAY NOTE - OBJECTIVE STATEMENT
31 y/o male with PMHx of Anxiety, HTN, Positive Cardiac Family History presenting for palpitations. As per pt, yesterday received Moderna Shot, afterwards during the night began to have palpitations/"fast heart rate". Pt states heart rate  at home approx 100s. Pt also experienced some left sided chest pain/"muscle spasms". Pt admits to having palpitations prior but states 29 y/o male with PMHx of Anxiety, HTN, Positive Cardiac Family History presenting for palpitations. As per pt, yesterday received Moderna Shot, afterwards during the night began to have palpitations/" fast heart rate". Pt states heart rate  at home approx 100s. Pt also experienced some left sided chest pain/" muscle spasms". Pt admits to having palpitations prior but states this episode was different. Pt states usually when he get palpitations, he takes his anxiety medication and Metoprolol 50mg, and symptoms will resolve. Pt took these medications today without any improvement. Denies any SOB, syncope, diaphoreis, dizziness/lightheadedness, numbness/tingling, N/V 31 y/o male with PMHx of Anxiety, HTN, Positive Cardiac Family History presenting for palpitations. As per pt, yesterday received Moderna Shot, afterwards during the night began to have palpitations/" fast heart rate". Pt states heart rate  at home approx 100s. Pt also experienced some left sided chest pain/" muscle spasms". Pt admits to having palpitations prior but states this episode was different. Pt states usually when he get palpitations, he takes his anxiety medication and Metoprolol 50mg, and symptoms will resolve. Pt took these medications today without any improvement.  Cardiologist - Dr Brown Denies any SOB, syncope, diaphoresis, dizziness/lightheadedness, numbness/tingling, N/V

## 2021-05-14 NOTE — ED PROVIDER NOTE - OBJECTIVE STATEMENT
29 yo M, nonsmoker, hx of HTN, esophagitis, and anxiety, s/p 2nd morderna vaccine yesterday, here with palpitation with recorded HR at home ~ 100, and tightness in the L side of the chest. The patient was concerned for vaccine reaction as fast HR listed as possible allergic rx sx and he contacted and [employer] and was entered into the database. No rash, throat tightness, wheeze, vomiting. Pt has had similar sx in the past and was seen at the [facility] for this. Pt has an abnormal EKG but the same as 4/13 and 4/15.   Pt also reporting generalized aches and chills since the vaccine. NO leg pain or swelling 31 yo M, nonsmoker, hx of HTN, esophagitis, and anxiety, s/p 2nd morderna vaccine yesterday, here with palpitation with recorded HR at home ~ 100, and tightness in the L side of the chest. The patient was concerned for vaccine reaction as fast HR listed as possible allergic rx sx and he contacted and [employer] and was entered into the database. No rash, throat tightness, wheeze, vomiting. Pt has had similar sx in the past and was seen at the [facility] for this. Pt has an abnormal EKG but the same as 4/13 and 4/15.   Pt also reporting generalized aches and chills since the vaccine. NO leg pain or swelling  Pt under care of Dr Brown as cardiologist

## 2021-05-14 NOTE — ED CDU PROVIDER INITIAL DAY NOTE - FAMILY HISTORY
Mother  Still living? Yes, Estimated age: Age Unknown  Family history of MI (myocardial infarction), Age at diagnosis: Age Unknown

## 2021-05-14 NOTE — ED CDU PROVIDER INITIAL DAY NOTE - ATTENDING CONTRIBUTION TO CARE
31 yo M pmh of HTN, esophagitis, hx of previous SVT presents with palpitations. patient reports that he got the maderna vaccine yesterday. Today having some body aches and reports feeling palpitations and some left sided chest pain. labs, ekg, cxr done. EKG shows some TWI when patient tachycardic, resolved when HR improved. cardiologist is Dr. Brown who is aware that patient is here and recommending CCTA. Patient placed in obs for further evaluation.     CONSTITUTIONAL: Well-developed; well-nourished; in no acute distress.   SKIN: warm, dry  HEAD: Normocephalic; atraumatic.  EYES: PERRL, EOMI, no conjunctival erythema  ENT: No nasal discharge; airway clear.  NECK: Supple; non tender.  CARD: S1, S2 normal;  Regular rate and rhythm.   RESP: No wheezes, rales or rhonchi.  ABD: soft non tender, non distended, no rebound or guarding  EXT: Normal ROM.  5/5 strength in all 4 extremities   LYMPH: No acute cervical adenopathy.  NEURO: Alert, oriented, grossly unremarkable. neurovascularly intact  PSYCH: Cooperative, appropriate.

## 2021-05-14 NOTE — ED PROVIDER NOTE - OBSERVING MD:
Your opinion matters!  Thank you for choosing the [facility].  You might receive a survey in the mail about your experience today to evaluate our clinic.  Please fill it out and return it in the pre-paid envelope.  It was a pleasure to care for you today!    If you any questions regarding your visit today or your discharge instructions call [phone].    
[place]

## 2021-05-14 NOTE — ED ADULT NURSE REASSESSMENT NOTE - NS ED NURSE REASSESS COMMENT FT1
receievd at 720 - a&o x3, ambulatory, on cm, sr noted, VS as noted, denies c/o att - pt for OBservation, iv # 18 r ac h/l site wnl, flushes w/ ease - will continue to monitor, FAHEEM ross at  [facility]

## 2021-05-14 NOTE — ED PROVIDER NOTE - PHYSICAL EXAMINATION
VITAL SIGNS: I have reviewed nursing notes and confirm.  CONSTITUTIONAL: Well-developed; well-nourished; in no acute distress.  SKIN: Skin exam is warm and dry, no acute rash.  HEAD: Normocephalic; atraumatic.  EYES: EOM intact; conjunctiva and sclera clear.  ENT: No nasal discharge; airway clear.    CARD: S1, S2 normal; no murmurs, gallops, or rubs. tachy at 100 and reg rhythm.  RESP: No wheezes, rales or rhonchi.  ABD: Normal bowel sounds; soft; non-distended; non-tender  EXT: Normal ROM. No clubbing, cyanosis or edema.  NEURO: Alert, oriented. Grossly unremarkable. No focal deficits.  PSYCH: Cooperative, appropriate.

## 2021-05-14 NOTE — ED ADULT NURSE NOTE - OBJECTIVE STATEMENT
Pt with hx of anxiety and HTN presents c/o palpitations. Pt reports he received the second dose of Moderna COVID vaccine and started having chills and palpitations. Pt reports vaccine paperwork instructed to come to ED for symptoms. Pt reports chest discomfort but states he's had this pain for a while and was told it was musculoskeletal. No fever, cough, SOB.

## 2021-05-14 NOTE — CHART NOTE - NSCHARTNOTEFT_GEN_A_CORE
SW spoke to pt in the ED.  Pt came to ED due to chest pain.   Pt's PCP is Dr. Amos Tesfaye.  Pt will go home at the point of discharge.  Pt currently has no home care needs and pt denies any financial, legal, housing, or other social issues.  Case Management will remain available if needs present prior to d/c.

## 2021-05-14 NOTE — ED CDU PROVIDER INITIAL DAY NOTE - PROGRESS NOTE DETAILS
received signout from Ralph Bass; pt in obs for eval of palpitations; pt with ce x2 negative; ccta in am; given metoprolol tart 100mg this evening; will reeval in am;

## 2021-05-15 VITALS
RESPIRATION RATE: 18 BRPM | HEART RATE: 68 BPM | OXYGEN SATURATION: 99 % | SYSTOLIC BLOOD PRESSURE: 117 MMHG | TEMPERATURE: 98 F | DIASTOLIC BLOOD PRESSURE: 71 MMHG

## 2021-05-15 PROCEDURE — 75574 CT ANGIO HRT W/3D IMAGE: CPT | Mod: 26,MA

## 2021-05-15 RX ORDER — METOPROLOL TARTRATE 50 MG
100 TABLET ORAL ONCE
Refills: 0 | Status: COMPLETED | OUTPATIENT
Start: 2021-05-15 | End: 2021-05-15

## 2021-05-15 RX ADMIN — Medication 100 MILLIGRAM(S): at 06:36

## 2021-05-15 NOTE — ED CDU PROVIDER SUBSEQUENT DAY NOTE - ATTENDING CONTRIBUTION TO CARE
29yo man h/o anxiety, strong FH early CAD (mother), on metoprolol for "tachycardia" was placed in CDU for CCTA after he c/o palpitations and had some TWI on EKG. ED workup other than ED was unremarkable, VS, exam as noted. Pt was monitored without incident, repeat EKG/enzymes ok. CCTA was CAD-RAD 0 for normal coronary arteries. Results were discussed with pt in detail, he will f/u with PMD and Dr Brown, return to the ED for persistent or worsening sx.

## 2021-05-15 NOTE — ED CDU PROVIDER SUBSEQUENT DAY NOTE - PROGRESS NOTE DETAILS
Pt seen at bedside, NAD. No overnight events. Pt scheduled for CCTA this am. Will continue to monitor patient, will follow up with results

## 2021-05-15 NOTE — ED CDU PROVIDER DISPOSITION NOTE - CARE PROVIDER_API CALL
Luis Brown  Cardiology  [address] [place] [address]  Phone: [phone]  Fax: [phone]  Established Patient  Follow Up Time: 7-10 Days

## 2021-05-15 NOTE — ED CDU PROVIDER DISPOSITION NOTE - PATIENT PORTAL LINK FT
You can access the FollowMyHealth Patient Portal offered by [facility] by registering at the following website: [email or web address]. By joining [employer]’s FollowMyHealth portal, you will also be able to view your health information using other applications (apps) compatible with our system.

## 2021-05-15 NOTE — ED CDU PROVIDER DISPOSITION NOTE - CLINICAL COURSE
29yo man h/o anxiety, strong FH early CAD (mother), on metoprolol for "tachycardia" was placed in CDU for CCTA after he c/o palpitations and had some TWI on EKG. ED workup other than ED was unremarkable, VS, exam as noted. Pt was monitored without incident, repeat EKG/enzymes ok. CCTA was CAD-RAD 0 for normal coronary arteries. Results were discussed with pt in detail, he will f/u with PMD and Dr Brown (who also was made aware of CT findings), return to the ED for persistent or worsening sx. Pt comfortable with discharge.

## 2021-05-15 NOTE — ED CDU PROVIDER SUBSEQUENT DAY NOTE - HISTORY
pt resting in obs without complaints; ce/ekg x2 negative; pt given metoprolol yesterday; will repeat this am @6am for ccta prep;

## 2021-05-20 ENCOUNTER — OUTPATIENT (OUTPATIENT)
Dept: OUTPATIENT SERVICES | Facility: HOSPITAL | Age: 31
LOS: 1 days | Discharge: HOME | End: 2021-05-20

## 2021-05-20 ENCOUNTER — APPOINTMENT (OUTPATIENT)
Dept: PSYCHIATRY | Facility: CLINIC | Age: 31
End: 2021-05-20

## 2021-05-28 ENCOUNTER — APPOINTMENT (OUTPATIENT)
Dept: CARDIOLOGY | Facility: CLINIC | Age: 31
End: 2021-05-28
Payer: MEDICAID

## 2021-05-28 VITALS
DIASTOLIC BLOOD PRESSURE: 89 MMHG | HEIGHT: 71 IN | BODY MASS INDEX: 25.76 KG/M2 | TEMPERATURE: 96.6 F | SYSTOLIC BLOOD PRESSURE: 128 MMHG | WEIGHT: 184 LBS

## 2021-05-28 PROCEDURE — 93000 ELECTROCARDIOGRAM COMPLETE: CPT

## 2021-05-28 PROCEDURE — 99214 OFFICE O/P EST MOD 30 MIN: CPT

## 2021-05-28 NOTE — ASSESSMENT
[FreeTextEntry1] : The patient was seen in the ER twice with palpitations and chest pain . The patient had a CTA which showed no CAD and no anomalous Coronary arteries . The patient had previously had a cardiac MRI which was normal . Previous MCOT showed no arrhythmias .

## 2021-05-28 NOTE — CARDIOLOGY SUMMARY
[___] : [unfilled] [de-identified] : 5- NSR NS T wave change Early repolarization  [de-identified] : 5- CTA of the coronary arteries . No CAD

## 2021-05-28 NOTE — REVIEW OF SYSTEMS
[SOB] : shortness of breath [Chest Discomfort] : chest discomfort [Palpitations] : palpitations [Negative] : Heme/Lymph

## 2021-05-28 NOTE — REASON FOR VISIT
[Symptom and Test Evaluation] : symptom and test evaluation [Arrhythmia/ECG Abnorrmalities] : arrhythmia/ECG abnormalities [Follow-Up - Clinic] : a clinic follow-up of [Hyperlipidemia] : hyperlipidemia [Hypertension] : hypertension [FreeTextEntry3] : Dr. Fox  [FreeTextEntry1] : Sinus tachycardia .

## 2021-05-28 NOTE — HISTORY OF PRESENT ILLNESS
[FreeTextEntry1] : The patient had palpitations  which were severe associated with CP . The patient was seen in the ER multiple times . Had CTA which was negative for CAD or anomalus Coronary Arteries . The patient is now seeing mental health sevices . The patient has been feeling better since the hospitalization and he feels more relaxed . He is having palpitations however still.

## 2021-06-02 ENCOUNTER — APPOINTMENT (OUTPATIENT)
Dept: CARDIOLOGY | Facility: CLINIC | Age: 31
End: 2021-06-02
Payer: MEDICAID

## 2021-06-02 VITALS
OXYGEN SATURATION: 98 % | HEART RATE: 86 BPM | WEIGHT: 180 LBS | TEMPERATURE: 97.9 F | HEIGHT: 71 IN | SYSTOLIC BLOOD PRESSURE: 126 MMHG | BODY MASS INDEX: 25.2 KG/M2 | DIASTOLIC BLOOD PRESSURE: 82 MMHG

## 2021-06-02 DIAGNOSIS — Z00.00 ENCOUNTER FOR GENERAL ADULT MEDICAL EXAMINATION W/OUT ABNORMAL FINDINGS: ICD-10-CM

## 2021-06-02 PROCEDURE — 93000 ELECTROCARDIOGRAM COMPLETE: CPT

## 2021-06-02 PROCEDURE — 99204 OFFICE O/P NEW MOD 45 MIN: CPT

## 2021-06-04 ENCOUNTER — OUTPATIENT (OUTPATIENT)
Dept: OUTPATIENT SERVICES | Facility: HOSPITAL | Age: 31
LOS: 1 days | Discharge: HOME | End: 2021-06-04

## 2021-06-04 ENCOUNTER — APPOINTMENT (OUTPATIENT)
Dept: PSYCHIATRY | Facility: CLINIC | Age: 31
End: 2021-06-04

## 2021-06-04 DIAGNOSIS — F41.1 GENERALIZED ANXIETY DISORDER: ICD-10-CM

## 2021-06-11 ENCOUNTER — APPOINTMENT (OUTPATIENT)
Dept: PSYCHIATRY | Facility: CLINIC | Age: 31
End: 2021-06-11

## 2021-06-11 ENCOUNTER — OUTPATIENT (OUTPATIENT)
Dept: OUTPATIENT SERVICES | Facility: HOSPITAL | Age: 31
LOS: 1 days | Discharge: HOME | End: 2021-06-11

## 2021-06-11 DIAGNOSIS — F41.1 GENERALIZED ANXIETY DISORDER: ICD-10-CM

## 2021-06-17 ENCOUNTER — OUTPATIENT (OUTPATIENT)
Dept: OUTPATIENT SERVICES | Facility: HOSPITAL | Age: 31
LOS: 1 days | Discharge: HOME | End: 2021-06-17

## 2021-06-17 ENCOUNTER — APPOINTMENT (OUTPATIENT)
Dept: PSYCHIATRY | Facility: CLINIC | Age: 31
End: 2021-06-17

## 2021-06-17 DIAGNOSIS — F41.1 GENERALIZED ANXIETY DISORDER: ICD-10-CM

## 2021-06-18 ENCOUNTER — OUTPATIENT (OUTPATIENT)
Dept: OUTPATIENT SERVICES | Facility: HOSPITAL | Age: 31
LOS: 1 days | Discharge: HOME | End: 2021-06-18

## 2021-06-18 ENCOUNTER — APPOINTMENT (OUTPATIENT)
Dept: PSYCHIATRY | Facility: CLINIC | Age: 31
End: 2021-06-18

## 2021-06-18 DIAGNOSIS — F41.1 GENERALIZED ANXIETY DISORDER: ICD-10-CM

## 2021-06-18 NOTE — HISTORY OF PRESENT ILLNESS
[FreeTextEntry1] : I had a pleasure of seeing Mr. HATCH for initial consultation for palpitations. \par \par Mr. HATCH is a 30 year-year old female with history of anxiety, HTN, DL is here for palpitations. \par \par had holter before- states that no symptoms at that time.\par \par Denies chest pain, shortness of breath, palpitation, dizziness or LOC except noted above.\par \par EKG: SR@ 86/min\par  Cardiac (12/20): Normal chambers, LVEF 57%\par TTE (08/20): Nl EF, 0.8 calcified structure contiguous with mitral chordae\par

## 2021-06-23 ENCOUNTER — APPOINTMENT (OUTPATIENT)
Dept: PSYCHIATRY | Facility: CLINIC | Age: 31
End: 2021-06-23

## 2021-06-23 ENCOUNTER — APPOINTMENT (OUTPATIENT)
Dept: CARDIOLOGY | Facility: CLINIC | Age: 31
End: 2021-06-23
Payer: MEDICAID

## 2021-06-23 ENCOUNTER — OUTPATIENT (OUTPATIENT)
Dept: OUTPATIENT SERVICES | Facility: HOSPITAL | Age: 31
LOS: 1 days | Discharge: HOME | End: 2021-06-23

## 2021-06-23 VITALS
HEIGHT: 71 IN | BODY MASS INDEX: 25.2 KG/M2 | SYSTOLIC BLOOD PRESSURE: 140 MMHG | OXYGEN SATURATION: 98 % | HEART RATE: 102 BPM | DIASTOLIC BLOOD PRESSURE: 80 MMHG | TEMPERATURE: 98.3 F | WEIGHT: 180 LBS

## 2021-06-23 DIAGNOSIS — F41.1 GENERALIZED ANXIETY DISORDER: ICD-10-CM

## 2021-06-23 PROCEDURE — 93228 REMOTE 30 DAY ECG REV/REPORT: CPT

## 2021-06-23 PROCEDURE — 93000 ELECTROCARDIOGRAM COMPLETE: CPT | Mod: 59

## 2021-06-23 PROCEDURE — 99213 OFFICE O/P EST LOW 20 MIN: CPT

## 2021-06-25 ENCOUNTER — APPOINTMENT (OUTPATIENT)
Dept: PSYCHIATRY | Facility: CLINIC | Age: 31
End: 2021-06-25

## 2021-06-25 ENCOUNTER — NON-APPOINTMENT (OUTPATIENT)
Age: 31
End: 2021-06-25

## 2021-06-27 NOTE — HISTORY OF PRESENT ILLNESS
[FreeTextEntry1] : I had a pleasure of seeing Mr. HATCH for initial consultation for palpitations. \par \par Mr. HATCH is a 30 year-year old female with history of anxiety, HTN, DL is here for palpitations. \par \par had holter before- states that no symptoms at that time.\par \par 6/23: Continues to have symptoms. \par \par Denies chest pain, shortness of breath, palpitation, dizziness or LOC except noted above.\par \par EKG (06/23): SR@ 102/min, OR: 162 ms, QRSd: 104 ms, QTc: 425 ms\par EKG: SR@ 86/min\par MR Cardiac (12/20): Normal chambers, LVEF 57%\par TTE (08/20): Nl EF, 0.8 calcified structure contiguous with mitral chordae\par Cardio: Dr. Brown\par

## 2021-06-27 NOTE — ASSESSMENT
[FreeTextEntry1] : ## Palpitations\par \par - Event monitor showed no sustained arrhythmias. There were 54 symptomatic event which are suggestive of sinus tachycardia. \par - In this scenario, there is no further EP work-up needed\par - Work-up for sinus tachycardia as per primary team, Behav team and cardio.\par - Continue Metoprolol as per Dr. Brown\par - Will sign-off.\par - Return as needed

## 2021-06-28 ENCOUNTER — APPOINTMENT (OUTPATIENT)
Dept: PSYCHIATRY | Facility: CLINIC | Age: 31
End: 2021-06-28
Payer: MEDICAID

## 2021-06-28 ENCOUNTER — OUTPATIENT (OUTPATIENT)
Dept: OUTPATIENT SERVICES | Facility: HOSPITAL | Age: 31
LOS: 1 days | Discharge: HOME | End: 2021-06-28

## 2021-06-28 DIAGNOSIS — F41.0 PANIC DISORDER [EPISODIC PAROXYSMAL ANXIETY]: ICD-10-CM

## 2021-06-28 DIAGNOSIS — F41.1 GENERALIZED ANXIETY DISORDER: ICD-10-CM

## 2021-06-28 PROCEDURE — 90792 PSYCH DIAG EVAL W/MED SRVCS: CPT | Mod: 95

## 2021-07-01 VITALS — WEIGHT: 182 LBS | HEIGHT: 70 IN | BODY MASS INDEX: 26.05 KG/M2

## 2021-07-07 ENCOUNTER — APPOINTMENT (OUTPATIENT)
Dept: PSYCHIATRY | Facility: CLINIC | Age: 31
End: 2021-07-07

## 2021-07-07 ENCOUNTER — OUTPATIENT (OUTPATIENT)
Dept: OUTPATIENT SERVICES | Facility: HOSPITAL | Age: 31
LOS: 1 days | Discharge: HOME | End: 2021-07-07

## 2021-07-07 DIAGNOSIS — F41.0 PANIC DISORDER [EPISODIC PAROXYSMAL ANXIETY]: ICD-10-CM

## 2021-07-07 DIAGNOSIS — F41.1 GENERALIZED ANXIETY DISORDER: ICD-10-CM

## 2021-07-12 ENCOUNTER — APPOINTMENT (OUTPATIENT)
Dept: PSYCHIATRY | Facility: CLINIC | Age: 31
End: 2021-07-12
Payer: MEDICAID

## 2021-07-12 ENCOUNTER — OUTPATIENT (OUTPATIENT)
Dept: OUTPATIENT SERVICES | Facility: HOSPITAL | Age: 31
LOS: 1 days | Discharge: HOME | End: 2021-07-12

## 2021-07-12 DIAGNOSIS — F41.1 GENERALIZED ANXIETY DISORDER: ICD-10-CM

## 2021-07-12 DIAGNOSIS — F41.0 PANIC DISORDER [EPISODIC PAROXYSMAL ANXIETY]: ICD-10-CM

## 2021-07-12 PROCEDURE — 99214 OFFICE O/P EST MOD 30 MIN: CPT | Mod: 95

## 2021-07-19 ENCOUNTER — APPOINTMENT (OUTPATIENT)
Dept: CARDIOLOGY | Facility: CLINIC | Age: 31
End: 2021-07-19
Payer: MEDICAID

## 2021-07-19 VITALS
TEMPERATURE: 97 F | BODY MASS INDEX: 25.48 KG/M2 | HEIGHT: 70 IN | SYSTOLIC BLOOD PRESSURE: 170 MMHG | WEIGHT: 178 LBS | HEART RATE: 96 BPM | DIASTOLIC BLOOD PRESSURE: 80 MMHG

## 2021-07-19 DIAGNOSIS — R07.9 CHEST PAIN, UNSPECIFIED: ICD-10-CM

## 2021-07-19 DIAGNOSIS — K21.9 GASTRO-ESOPHAGEAL REFLUX DISEASE W/OUT ESOPHAGITIS: ICD-10-CM

## 2021-07-19 PROCEDURE — 93000 ELECTROCARDIOGRAM COMPLETE: CPT

## 2021-07-19 PROCEDURE — 99214 OFFICE O/P EST MOD 30 MIN: CPT

## 2021-07-19 NOTE — CARDIOLOGY SUMMARY
[de-identified] : 5- NSR NS T wave change Early repolarization  [de-identified] : 5- CTA of the coronary arteries . No CAD  [___] : [unfilled]

## 2021-07-19 NOTE — REASON FOR VISIT
[Symptom and Test Evaluation] : symptom and test evaluation [Arrhythmia/ECG Abnorrmalities] : arrhythmia/ECG abnormalities [FreeTextEntry3] : Dr. Fox  [Follow-Up - Clinic] : a clinic follow-up of [Hyperlipidemia] : hyperlipidemia [Hypertension] : hypertension [FreeTextEntry1] : Sinus tachycardia .

## 2021-07-19 NOTE — ASSESSMENT
[FreeTextEntry1] : The patient has had less palpitations . He has had severe anxiety and he is being treated by psychiatry .

## 2021-07-19 NOTE — HISTORY OF PRESENT ILLNESS
[FreeTextEntry1] : The patient had his beta blockers decreased . The patient has had periods of palpitations which are thought to be sinus tachycardia from anxiety . He is seeing psychiatry and psychology .

## 2021-07-20 ENCOUNTER — APPOINTMENT (OUTPATIENT)
Dept: PSYCHIATRY | Facility: CLINIC | Age: 31
End: 2021-07-20

## 2021-07-20 ENCOUNTER — EMERGENCY (EMERGENCY)
Facility: HOSPITAL | Age: 31
LOS: 0 days | Discharge: HOME | End: 2021-07-20
Attending: EMERGENCY MEDICINE | Admitting: EMERGENCY MEDICINE
Payer: MEDICAID

## 2021-07-20 ENCOUNTER — NON-APPOINTMENT (OUTPATIENT)
Age: 31
End: 2021-07-20

## 2021-07-20 VITALS
SYSTOLIC BLOOD PRESSURE: 150 MMHG | WEIGHT: 177.91 LBS | DIASTOLIC BLOOD PRESSURE: 94 MMHG | RESPIRATION RATE: 18 BRPM | OXYGEN SATURATION: 100 % | HEART RATE: 110 BPM | TEMPERATURE: 97 F | HEIGHT: 70 IN

## 2021-07-20 VITALS
OXYGEN SATURATION: 100 % | DIASTOLIC BLOOD PRESSURE: 93 MMHG | RESPIRATION RATE: 18 BRPM | HEART RATE: 94 BPM | SYSTOLIC BLOOD PRESSURE: 137 MMHG

## 2021-07-20 DIAGNOSIS — Z87.19 PERSONAL HISTORY OF OTHER DISEASES OF THE DIGESTIVE SYSTEM: ICD-10-CM

## 2021-07-20 DIAGNOSIS — Z86.69 PERSONAL HISTORY OF OTHER DISEASES OF THE NERVOUS SYSTEM AND SENSE ORGANS: ICD-10-CM

## 2021-07-20 DIAGNOSIS — K21.9 GASTRO-ESOPHAGEAL REFLUX DISEASE WITHOUT ESOPHAGITIS: ICD-10-CM

## 2021-07-20 DIAGNOSIS — F45.8 OTHER SOMATOFORM DISORDERS: ICD-10-CM

## 2021-07-20 DIAGNOSIS — Z87.09 PERSONAL HISTORY OF OTHER DISEASES OF THE RESPIRATORY SYSTEM: ICD-10-CM

## 2021-07-20 DIAGNOSIS — R13.10 DYSPHAGIA, UNSPECIFIED: ICD-10-CM

## 2021-07-20 DIAGNOSIS — Z86.79 PERSONAL HISTORY OF OTHER DISEASES OF THE CIRCULATORY SYSTEM: ICD-10-CM

## 2021-07-20 DIAGNOSIS — Z79.899 OTHER LONG TERM (CURRENT) DRUG THERAPY: ICD-10-CM

## 2021-07-20 DIAGNOSIS — I10 ESSENTIAL (PRIMARY) HYPERTENSION: ICD-10-CM

## 2021-07-20 DIAGNOSIS — Z86.59 PERSONAL HISTORY OF OTHER MENTAL AND BEHAVIORAL DISORDERS: ICD-10-CM

## 2021-07-20 PROCEDURE — 70360 X-RAY EXAM OF NECK: CPT | Mod: 26

## 2021-07-20 PROCEDURE — 99284 EMERGENCY DEPT VISIT MOD MDM: CPT

## 2021-07-20 RX ORDER — METOPROLOL TARTRATE 50 MG
0 TABLET ORAL
Qty: 0 | Refills: 0 | DISCHARGE

## 2021-07-20 RX ORDER — DIPHENHYDRAMINE HYDROCHLORIDE AND LIDOCAINE HYDROCHLORIDE AND ALUMINUM HYDROXIDE AND MAGNESIUM HYDRO
30 KIT ONCE
Refills: 0 | Status: COMPLETED | OUTPATIENT
Start: 2021-07-20 | End: 2021-07-20

## 2021-07-20 RX ORDER — LANSOPRAZOLE 15 MG/1
1 CAPSULE, DELAYED RELEASE ORAL
Qty: 0 | Refills: 0 | DISCHARGE

## 2021-07-20 RX ORDER — METOPROLOL TARTRATE 50 MG
1 TABLET ORAL
Qty: 0 | Refills: 0 | DISCHARGE

## 2021-07-20 RX ADMIN — DIPHENHYDRAMINE HYDROCHLORIDE AND LIDOCAINE HYDROCHLORIDE AND ALUMINUM HYDROXIDE AND MAGNESIUM HYDRO 30 MILLILITER(S): KIT at 14:27

## 2021-07-20 NOTE — ED PROVIDER NOTE - OBJECTIVE STATEMENT
29 y/o male with PMHx of Anxiety, HTN, GERD on prevacid, non smoker, presents to the Ed for difficulty swallowing worsening over past few days. patient states he is under increased stress due to family issues. patient tearful in the Ed. patient denies any abdominal pain, burining in throat. patient denies any weight gain. 29 y/o male with PMHx of Anxiety, HTN, GERD on prevacid, non smoker, presents to the Ed for difficulty swallowing worsening over past few days. patient states he is under increased stress due to family issues. patient tearful in the Ed. patient denies any abdominal pain, burining in throat. patient denies any weight gain. patient denies any vomiting . patient states he is nervous to eat. no sob, cp.

## 2021-07-20 NOTE — ED PROVIDER NOTE - ATTENDING CONTRIBUTION TO CARE
29 y/o male, PMHx of Anxiety, HTN, GERD on prevacid, non smoker, presents to the ED for difficulty swallowing worsening over past few days. Patient states he is under increased stress due to family issues. Patient is tearful in the ED. Patient denies any abdominal pain, burning in throat. Patient denies any weight gain/loss. Patient denies any vomiting. Able to swallow and eat. No drooling. Patient states he is nervous to eat. Went to GI, was scoped, told that he has gastritis and was started on PPI. Has an apt with therapist today. On exam, pt in NAD, AAOx3, head NC/AT, CN II-XII intact, throat (-) erythema, lungs CTA B/L, CV S1S2 regular, abdomen soft/NT/ND/(+)BS, ext (-) edema, motor 5/5x4, sensation intact. Will d/c to go see therapist and follow up with GI.

## 2021-07-20 NOTE — ED ADULT TRIAGE NOTE - CHIEF COMPLAINT QUOTE
patient states he has a history of Gerd and has been experiencing a hard time swallowing for "the last few days"

## 2021-07-20 NOTE — ED PROVIDER NOTE - CLINICAL SUMMARY MEDICAL DECISION MAKING FREE TEXT BOX
31 y/o male, PMHx of Anxiety, HTN, GERD on prevacid, non smoker, presents to the ED for difficulty swallowing worsening over past few days. Patient states he is under increased stress due to family issues. Patient is tearful in the ED. Patient denies any abdominal pain, burning in throat. Patient denies any weight gain/loss. Patient denies any vomiting. Able to swallow and eat. No drooling. Patient states he is nervous to eat. Went to GI, was scoped, told that he has gastritis and was started on PPI. Has an apt with therapist today. On exam, pt in NAD, AAOx3, head NC/AT, CN II-XII intact, throat (-) erythema, lungs CTA B/L, CV S1S2 regular, abdomen soft/NT/ND/(+)BS, ext (-) edema, motor 5/5x4, sensation intact. Will d/c to go see therapist and follow up with GI.

## 2021-07-20 NOTE — ED ADULT TRIAGE NOTE - MEANS OF ARRIVAL
October 30, 2019       Ratna Falcon, DO  [address] [facility]  2nd Floor  [facility] [address]  VIA In Basket      Patient: Angela Latham   YOB: 2004   Date of Visit: 10/30/2019       Dear Dr. Falcon:    Thank you for referring Angela Latham to me for evaluation. Below are my notes for this visit with her.    If you have questions, please do not hesitate to call me. I look forward to following your patient along with you.      Sincerely,        Tommy Delgadillo MD        CC: No Recipients  Tommy Delgadillo MD  10/30/2019 10:04 AM  Sign when Signing Visit  Angela Latham is a 15 year old female who presents to the Dermatology clinic in consultation for evaluation of hair loss.  Location: vertex scalp   Duration: 2 years   Bald areas: 1   Scalp symptoms: pain and itching within spot   Scalp lesions: always flaking   Treatment: none    Shampoo: head and shoulders qd-qod, rinses right away   Fhx thyroid disease: none    2. Rashes on body, rough and flaky  Can be all over   2 years   Come and go   Resolve by itself within 3 weeks   Symptoms: painful, sometimes itchy   Treatment: hydrocortisone 1% cream OTC occasionally  Uses aveeno fragrance free lotion when has rashes   No personal hx of sensitive skin   Triggers: none   3. Bump on chest hurts occasionally when running     PMH: none   Shx: no tobacco exposure  All other systems reviewed and negative.    PHYSICAL EXAMINATION:   Well appearing, normal mood and affect.  A general skin exam including scalp, face, eylids, lips, neck, chest, abdomen, back, arms, legs, digits and nails was performed today.   -left frontal scalp 1.3 cm circular area of alopecia  -no rash today  -a few firm areas within breast tissue bilaterally    ASSESSMENT AND PLAN:  1. Alopecia areata.   Reviewed autoimmune nature and unpredictable course with most children having just one episode and others having more hair loss in the future at unpredictable intervals and  unpredictable amounts.   In the majority of children with one or a few isolated lesions, the hair will re-grow spontaneously within 1 year. Treatment can be used to help regrow the hair, but there is no treatment to prevent new lesion.  Treatment options and side effects discussed  Discussed somewhat increased risk of autoimmune thyroid disease in children with AA.    As review of system is currently negative for thyroid disease and there is no family history of thyroid disease, no testing is necessary at this time.   Pt opted for IL steroid.  Area of alopecia injected with Kenalog 10 total 0.2 ml.   Start minoxidil 5% qd.  Alopecia areata handout provided and discussed.   Fup prn  2. Rash not present today. By hx it sounds eczematous. Use Aveno emollient qd even if rash not present. Use 1% HC to rash bid.   3. Fibrocystic changes in breast. Pt reassured.                       ambulatory

## 2021-07-20 NOTE — ED PROVIDER NOTE - PATIENT PORTAL LINK FT
You can access the FollowMyHealth Patient Portal offered by [facility] by registering at the following website: [email or web address]. By joining [employer]’s FollowMyHealth portal, you will also be able to view your health information using other applications (apps) compatible with our system.

## 2021-07-20 NOTE — ED PROVIDER NOTE - PHYSICAL EXAMINATION
Vital Signs: I have reviewed the initial vital signs.  Constitutional: well-nourished, appears stated age, no acute distress  Head: atraumatic and normocephalic  Eyes:PERRLA, EOMI, clear conjunctiva  ENT: no stridor, oropharynx clear, no uvula edema, no tongue elevation, MMM, no anterior neck swelling   Cardiovascular: regular rate, regular rhythm, well-perfused extremities  Respiratory: unlabored respiratory effort, clear to auscultation bilaterally  Gastrointestinal: soft, non-tender abdomen, no pulsatile mass  Neuro: awake, alert, follows commands, oriented, no focal deficits  ;

## 2021-07-20 NOTE — ED PROVIDER NOTE - NSFOLLOWUPINSTRUCTIONS_ED_ALL_ED_FT
Be sure to take precautions to limit/avoid symptoms. This includes eating small, frequent meals instead of 3 large meals per day. This also includes limiting consumption of: spicy foods, alcohol, fatty foods, chocolate, coffee, peppermint, tomatoes/tomato products. Avoid laying down for at least 3 hours after a meal. Also, you may benefit from using medications to control the acid in your stomach called proton pump inhibitors or H2 blockers, some examples of these that can be purchased over the counter include Pantoprazole, Omeprazole, Ranitidine.    be sure to follow up with gastroenterologist and your psychiatrist as scheduled

## 2021-07-23 ENCOUNTER — OUTPATIENT (OUTPATIENT)
Dept: OUTPATIENT SERVICES | Facility: HOSPITAL | Age: 31
LOS: 1 days | Discharge: HOME | End: 2021-07-23
Payer: MEDICAID

## 2021-07-23 ENCOUNTER — OUTPATIENT (OUTPATIENT)
Dept: OUTPATIENT SERVICES | Facility: HOSPITAL | Age: 31
LOS: 1 days | Discharge: HOME | End: 2021-07-23

## 2021-07-23 ENCOUNTER — APPOINTMENT (OUTPATIENT)
Dept: PSYCHIATRY | Facility: CLINIC | Age: 31
End: 2021-07-23
Payer: MEDICAID

## 2021-07-23 DIAGNOSIS — K21.9 GASTRO-ESOPHAGEAL REFLUX DISEASE WITHOUT ESOPHAGITIS: ICD-10-CM

## 2021-07-23 DIAGNOSIS — F41.1 GENERALIZED ANXIETY DISORDER: ICD-10-CM

## 2021-07-23 DIAGNOSIS — F41.0 PANIC DISORDER [EPISODIC PAROXYSMAL ANXIETY]: ICD-10-CM

## 2021-07-23 PROCEDURE — 99214 OFFICE O/P EST MOD 30 MIN: CPT | Mod: 95

## 2021-07-23 PROCEDURE — 74220 X-RAY XM ESOPHAGUS 1CNTRST: CPT | Mod: 26

## 2021-08-06 ENCOUNTER — EMERGENCY (EMERGENCY)
Facility: HOSPITAL | Age: 31
LOS: 0 days | Discharge: HOME | End: 2021-08-07
Attending: EMERGENCY MEDICINE | Admitting: EMERGENCY MEDICINE
Payer: MEDICAID

## 2021-08-06 VITALS
SYSTOLIC BLOOD PRESSURE: 143 MMHG | DIASTOLIC BLOOD PRESSURE: 96 MMHG | OXYGEN SATURATION: 100 % | HEART RATE: 94 BPM | WEIGHT: 171.96 LBS | HEIGHT: 70 IN | RESPIRATION RATE: 17 BRPM | TEMPERATURE: 99 F

## 2021-08-06 DIAGNOSIS — R07.9 CHEST PAIN, UNSPECIFIED: ICD-10-CM
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
## 2021-08-06 DIAGNOSIS — R10.13 EPIGASTRIC PAIN: ICD-10-CM

## 2021-08-06 DIAGNOSIS — Z87.19 PERSONAL HISTORY OF OTHER DISEASES OF THE DIGESTIVE SYSTEM: ICD-10-CM
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
## 2021-08-06 DIAGNOSIS — K21.9 GASTRO-ESOPHAGEAL REFLUX DISEASE WITHOUT ESOPHAGITIS: ICD-10-CM

## 2021-08-06 DIAGNOSIS — Z79.899 OTHER LONG TERM (CURRENT) DRUG THERAPY: ICD-10-CM

## 2021-08-06 DIAGNOSIS — R10.9 UNSPECIFIED ABDOMINAL PAIN: ICD-10-CM

## 2021-08-06 DIAGNOSIS — R00.2 PALPITATIONS: ICD-10-CM
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
## 2021-08-06 DIAGNOSIS — R10.33 PERIUMBILICAL PAIN: ICD-10-CM

## 2021-08-06 PROCEDURE — 99285 EMERGENCY DEPT VISIT HI MDM: CPT

## 2021-08-06 NOTE — ED ADULT TRIAGE NOTE - CHIEF COMPLAINT QUOTE
Pt states he has a known  hernia and developed sudden onset of abdominal pain at 1900. Pt was unable to get in touch with his GI MD for months.

## 2021-08-07 VITALS — RESPIRATION RATE: 18 BRPM | HEART RATE: 89 BPM | OXYGEN SATURATION: 99 % | TEMPERATURE: 98 F

## 2021-08-07 LAB
ALBUMIN SERPL ELPH-MCNC: 5.2 G/DL — SIGNIFICANT CHANGE UP (ref 3.5–5.2)
ALP SERPL-CCNC: 74 U/L — SIGNIFICANT CHANGE UP (ref 30–115)
ALT FLD-CCNC: 14 U/L — SIGNIFICANT CHANGE UP (ref 0–41)
ANION GAP SERPL CALC-SCNC: 12 MMOL/L — SIGNIFICANT CHANGE UP (ref 7–14)
AST SERPL-CCNC: 18 U/L — SIGNIFICANT CHANGE UP (ref 0–41)
BASOPHILS # BLD AUTO: 0.03 K/UL — SIGNIFICANT CHANGE UP (ref 0–0.2)
BASOPHILS NFR BLD AUTO: 0.4 % — SIGNIFICANT CHANGE UP (ref 0–1)
BILIRUB SERPL-MCNC: 1.1 MG/DL — SIGNIFICANT CHANGE UP (ref 0.2–1.2)
BUN SERPL-MCNC: 10 MG/DL — SIGNIFICANT CHANGE UP (ref 10–20)
CALCIUM SERPL-MCNC: 10.1 MG/DL — SIGNIFICANT CHANGE UP (ref 8.5–10.1)
CHLORIDE SERPL-SCNC: 103 MMOL/L — SIGNIFICANT CHANGE UP (ref 98–110)
CO2 SERPL-SCNC: 25 MMOL/L — SIGNIFICANT CHANGE UP (ref 17–32)
CREAT SERPL-MCNC: 1.1 MG/DL — SIGNIFICANT CHANGE UP (ref 0.7–1.5)
EOSINOPHIL # BLD AUTO: 0.03 K/UL — SIGNIFICANT CHANGE UP (ref 0–0.7)
EOSINOPHIL NFR BLD AUTO: 0.4 % — SIGNIFICANT CHANGE UP (ref 0–8)
GLUCOSE SERPL-MCNC: 88 MG/DL — SIGNIFICANT CHANGE UP (ref 70–99)
HCT VFR BLD CALC: 44.3 % — SIGNIFICANT CHANGE UP (ref 42–52)
HGB BLD-MCNC: 15.7 G/DL — SIGNIFICANT CHANGE UP (ref 14–18)
IMM GRANULOCYTES NFR BLD AUTO: 0.1 % — SIGNIFICANT CHANGE UP (ref 0.1–0.3)
LACTATE SERPL-SCNC: 0.9 MMOL/L — SIGNIFICANT CHANGE UP (ref 0.7–2)
LIDOCAIN IGE QN: 26 U/L — SIGNIFICANT CHANGE UP (ref 7–60)
LYMPHOCYTES # BLD AUTO: 1.94 K/UL — SIGNIFICANT CHANGE UP (ref 1.2–3.4)
LYMPHOCYTES # BLD AUTO: 24.1 % — SIGNIFICANT CHANGE UP (ref 20.5–51.1)
MCHC RBC-ENTMCNC: 28.9 PG — SIGNIFICANT CHANGE UP (ref 27–31)
MCHC RBC-ENTMCNC: 35.4 G/DL — SIGNIFICANT CHANGE UP (ref 32–37)
MCV RBC AUTO: 81.4 FL — SIGNIFICANT CHANGE UP (ref 80–94)
MONOCYTES # BLD AUTO: 0.53 K/UL — SIGNIFICANT CHANGE UP (ref 0.1–0.6)
MONOCYTES NFR BLD AUTO: 6.6 % — SIGNIFICANT CHANGE UP (ref 1.7–9.3)
NEUTROPHILS # BLD AUTO: 5.52 K/UL — SIGNIFICANT CHANGE UP (ref 1.4–6.5)
NEUTROPHILS NFR BLD AUTO: 68.4 % — SIGNIFICANT CHANGE UP (ref 42.2–75.2)
NRBC # BLD: 0 /100 WBCS — SIGNIFICANT CHANGE UP (ref 0–0)
PLATELET # BLD AUTO: 266 K/UL — SIGNIFICANT CHANGE UP (ref 130–400)
POTASSIUM SERPL-MCNC: 3.8 MMOL/L — SIGNIFICANT CHANGE UP (ref 3.5–5)
POTASSIUM SERPL-SCNC: 3.8 MMOL/L — SIGNIFICANT CHANGE UP (ref 3.5–5)
PROT SERPL-MCNC: 7.5 G/DL — SIGNIFICANT CHANGE UP (ref 6–8)
RBC # BLD: 5.44 M/UL — SIGNIFICANT CHANGE UP (ref 4.7–6.1)
RBC # FLD: 12.5 % — SIGNIFICANT CHANGE UP (ref 11.5–14.5)
SODIUM SERPL-SCNC: 140 MMOL/L — SIGNIFICANT CHANGE UP (ref 135–146)
TROPONIN T SERPL-MCNC: <0.01 NG/ML — SIGNIFICANT CHANGE UP
TROPONIN T SERPL-MCNC: <0.01 NG/ML — SIGNIFICANT CHANGE UP
WBC # BLD: 8.06 K/UL — SIGNIFICANT CHANGE UP (ref 4.8–10.8)
WBC # FLD AUTO: 8.06 K/UL — SIGNIFICANT CHANGE UP (ref 4.8–10.8)

## 2021-08-07 PROCEDURE — 74177 CT ABD & PELVIS W/CONTRAST: CPT | Mod: 26,MA

## 2021-08-07 PROCEDURE — 71046 X-RAY EXAM CHEST 2 VIEWS: CPT | Mod: 26

## 2021-08-07 PROCEDURE — 93010 ELECTROCARDIOGRAM REPORT: CPT

## 2021-08-07 RX ORDER — FAMOTIDINE 10 MG/ML
20 INJECTION INTRAVENOUS ONCE
Refills: 0 | Status: COMPLETED | OUTPATIENT
Start: 2021-08-07 | End: 2021-08-07

## 2021-08-07 RX ORDER — SODIUM CHLORIDE 9 MG/ML
1000 INJECTION, SOLUTION INTRAVENOUS
Refills: 0 | Status: DISCONTINUED | OUTPATIENT
Start: 2021-08-07 | End: 2021-08-07

## 2021-08-07 RX ORDER — DIPHENHYDRAMINE HCL 50 MG
50 CAPSULE ORAL ONCE
Refills: 0 | Status: COMPLETED | OUTPATIENT
Start: 2021-08-07 | End: 2021-08-07

## 2021-08-07 RX ORDER — SODIUM CHLORIDE 9 MG/ML
1000 INJECTION, SOLUTION INTRAVENOUS ONCE
Refills: 0 | Status: COMPLETED | OUTPATIENT
Start: 2021-08-07 | End: 2021-08-07

## 2021-08-07 RX ORDER — IOHEXOL 300 MG/ML
30 INJECTION, SOLUTION INTRAVENOUS ONCE
Refills: 0 | Status: COMPLETED | OUTPATIENT
Start: 2021-08-07 | End: 2021-08-07

## 2021-08-07 RX ADMIN — SODIUM CHLORIDE 1000 MILLILITER(S): 9 INJECTION, SOLUTION INTRAVENOUS at 03:26

## 2021-08-07 RX ADMIN — FAMOTIDINE 20 MILLIGRAM(S): 10 INJECTION INTRAVENOUS at 01:29

## 2021-08-07 RX ADMIN — Medication 50 MILLIGRAM(S): at 05:53

## 2021-08-07 RX ADMIN — SODIUM CHLORIDE 1000 MILLILITER(S): 9 INJECTION, SOLUTION INTRAVENOUS at 04:42

## 2021-08-07 RX ADMIN — SODIUM CHLORIDE 1000 MILLILITER(S): 9 INJECTION, SOLUTION INTRAVENOUS at 01:28

## 2021-08-07 RX ADMIN — IOHEXOL 30 MILLILITER(S): 300 INJECTION, SOLUTION INTRAVENOUS at 01:28

## 2021-08-07 RX ADMIN — Medication 102 MILLIGRAM(S): at 04:42

## 2021-08-07 RX ADMIN — SODIUM CHLORIDE 1000 MILLILITER(S): 9 INJECTION, SOLUTION INTRAVENOUS at 05:53

## 2021-08-07 NOTE — ED PROVIDER NOTE - PROVIDER TOKENS
PROVIDER:[TOKEN:[[id number]],FOLLOWUP:[1-3 Days]],PROVIDER:[TOKEN:[[id number]],FOLLOWUP:[1-3 Days]]

## 2021-08-07 NOTE — ED PROVIDER NOTE - ATTENDING CONTRIBUTION TO CARE
Patient states that, he has h/o hiatal hernia, today he was exercising with a ball, accidentally ball rolled off. At that time he last balance and twisted his chest/upper abd area, followed by worsening abd pain and felt gas bubble stuck in the chest area, symptoms continued, so had come to ED for evaluation. Denies any head/neck/back injury. Denies sob, denies n/v/f/c/URI symptoms, denies  symptoms, no rash.   Vitals reviewed.   No external signs of craniofacial trauma noted.   Lungs: CTA,   abd: +BS, +epigastric/periumbilical tenderness, ND, soft, no hernias noted.   Ext: no E/E/C: pulses+  no calf tenderness noted.   CNS: awake, alert, o x 3, no focal neurologic deficits.   Back: no tenderness, no pain on ROM,   A/P: Abdominal/chest pain,   labs, EKG, imaging, reevaluation.

## 2021-08-07 NOTE — ED ADULT NURSE REASSESSMENT NOTE - NS ED NURSE REASSESS COMMENT FT1
430 upon reeval pt - pt states having a panic attack, feels like he cant catch his breath, palpitations, hyperventilating, moved to 1b, placed on full cm, stat ekg done,  at bedsid e- after about 10m -= pt relaxes, VS stabilized,

## 2021-08-07 NOTE — ED ADULT NURSE NOTE - OBJECTIVE STATEMENT
md hpi =9 y/o m h/o hiatal hernia, GERD p/w esophageal/abd pain. Pt was bent over sitting on an exercise ball and twisted up abruptly, felt sudden popping sensation and abd being "pushed up" and a "gas pocket" develop. Was able to tolerate PO liquids after incident and again in the ED. Still feels sensation of gas pocket. Took lansoprazole/prevacid, TUMs before coming to ED.  	Denies fevers/chills, n/v/d.

## 2021-08-07 NOTE — ED PROVIDER NOTE - CARE PROVIDER_API CALL
Luis Brown  Cardiology  [address] [place] [id number]  Phone: [phone]  Fax: [phone]  Follow Up Time: 1-3 Days    Cony Pacheco)  Gastroenterology  [address] [place] [address]  Phone: [phone]  Fax: [phone]  Follow Up Time: 1-3 Days

## 2021-08-07 NOTE — ED PROVIDER NOTE - PHYSICAL EXAMINATION
Vital Signs: I have reviewed the initial vital signs.  Constitutional: well-nourished, appears stated age, no acute distress.  HEENT: Airway patent, moist MM, no erythema/swelling/deformity of oral structures. EOMI, PERRLA.  CV: regular rate, regular rhythm, well-perfused extremities, 2+ b/l DP and radial pulses equal.  Lungs: BCTA, no increased WOB.  ABD: +Epigastric tenderness, no guarding or rebound, no pulsatile mass, no hernias, no flank pain.   MSK: Neck supple, nontender, nl ROM, no stepoff. Chest nontender. Back nontender in TLS spine or to b/l bony structures. Ext nontender, nl rom, no deformity.   INTEG: Skin warm, dry, no rash.  NEURO: A&Ox3, moving all extremities, normal speech  PSYCH: Calm, cooperative, normal affect and interaction.

## 2021-08-07 NOTE — ED PROVIDER NOTE - CLINICAL SUMMARY MEDICAL DECISION MAKING FREE TEXT BOX
pt evaluated for epigastric discomfort, lab unremarkable, troponin negative x 2. CXR NAPD, CT A/p without acute pathology. Pt had episode palpitations after receiving IV contrast, stated it made him feel nervous. No SOB, CP, HA, rash, pruritus,  vomiting or change in abdominal sx. Pt treated with famotidine and benadryl though likely side effect rather than allergic rxn. pt sx resolved and reported feeling well to go home. Serial enzymes negative. Pt CCTA 5/2021 normal coronaries. Advised close follow up with cardio and PMD for reevaluation and pt agreed. Strict return precautions advised and pt verbalized understanding. pt evaluated for epigastric discomfort, lab unremarkable, troponin negative x 2. CXR NAPD, CT A/p without acute pathology. Pt had episode palpitations after receiving IV contrast, stated it made him feel nervous. No SOB, CP, HA, rash, pruritus,  vomiting or change in abdominal sx. Pt treated with famotidine and benadryl though likely side effect rather than allergic rxn. pt sx resolved and reported feeling well to go home. Serial enzymes negative. Pt CCTA 5/2021 normal coronaries. Cardaic MRI cone 12/20 wnl. Advised close follow up with pts cardiologist and PMD for reevaluation and pt agreed. Strict return precautions advised and pt verbalized understanding.

## 2021-08-07 NOTE — ED PROVIDER NOTE - PROGRESS NOTE DETAILS
Patient remained stable in ED, pending CT scan, signed out to Dr. Higgins at shift change. SS: S/p CT pt experienced palpiptations and tachycardia. EKG was taken and pt placed on monitor. Pt and mom endorsed that pt decided himself to stop taking his buspirone/buspar for the last week and has not had panic attacks during that time as per mom  Ordered IV benadryl, fluids SS: Reassed pt- pt orginal epigastric sxs and palpitations improved. Reviewed labs, images and plan. Pt and mom expresses understanding and agreement

## 2021-08-07 NOTE — ED PROVIDER NOTE - CARE PROVIDERS DIRECT ADDRESSES
[email or web address]

## 2021-08-07 NOTE — ED PROVIDER NOTE - PATIENT PORTAL LINK FT
You can access the FollowMyHealth Patient Portal offered by [facility] by registering at the following website: [email or web address]. By joining [employer]’s FollowMyHealth portal, you will also be able to view your health information using other applications (apps) compatible with our system.

## 2021-08-07 NOTE — ED PROVIDER NOTE - NSFOLLOWUPINSTRUCTIONS_ED_ALL_ED_FT
Follow up with Dr. Brown as discussed, and your GI doctor.    Return if your symptoms worsen or persist!

## 2021-08-10 ENCOUNTER — APPOINTMENT (OUTPATIENT)
Dept: CARDIOLOGY | Facility: CLINIC | Age: 31
End: 2021-08-10
Payer: MEDICAID

## 2021-08-10 VITALS — WEIGHT: 170 LBS | HEART RATE: 83 BPM | TEMPERATURE: 97 F | BODY MASS INDEX: 24.34 KG/M2 | HEIGHT: 70 IN

## 2021-08-10 VITALS — SYSTOLIC BLOOD PRESSURE: 122 MMHG | DIASTOLIC BLOOD PRESSURE: 70 MMHG

## 2021-08-10 DIAGNOSIS — I10 ESSENTIAL (PRIMARY) HYPERTENSION: ICD-10-CM

## 2021-08-10 DIAGNOSIS — F41.0 PANIC DISORDER [EPISODIC PAROXYSMAL ANXIETY]: ICD-10-CM

## 2021-08-10 DIAGNOSIS — R94.31 ABNORMAL ELECTROCARDIOGRAM [ECG] [EKG]: ICD-10-CM

## 2021-08-10 DIAGNOSIS — F41.9 ANXIETY DISORDER, UNSPECIFIED: ICD-10-CM

## 2021-08-10 DIAGNOSIS — R00.2 PALPITATIONS: ICD-10-CM

## 2021-08-10 PROCEDURE — 99214 OFFICE O/P EST MOD 30 MIN: CPT

## 2021-08-10 PROCEDURE — 93000 ELECTROCARDIOGRAM COMPLETE: CPT

## 2021-08-10 RX ORDER — BUSPIRONE HYDROCHLORIDE 10 MG/1
10 TABLET ORAL TWICE DAILY
Qty: 60 | Refills: 1 | Status: DISCONTINUED | COMMUNITY
End: 2021-08-10

## 2021-08-10 RX ORDER — ESCITALOPRAM OXALATE 5 MG/1
5 TABLET ORAL DAILY
Qty: 30 | Refills: 1 | Status: DISCONTINUED | COMMUNITY
Start: 2021-07-23 | End: 2021-08-10

## 2021-08-10 NOTE — CARDIOLOGY SUMMARY
[___] : [unfilled] [de-identified] : 5- NSR NS T wave change Early repolarization  [de-identified] : 5- CTA of the coronary arteries . No CAD

## 2021-08-10 NOTE — HISTORY OF PRESENT ILLNESS
[FreeTextEntry1] : The patient had epigastric pain  and GERD symptoms and went to the ER . The patient had felt his food come up his esophagus .  CT scan of the abdomen was dominique . Cardiac work up was normal . He dos have an IVCD but cardiac MRI and CTA of the coronary arteries were normal. He had seen EP as well. His underlying issue has been his anxiety . He is seeing psychiatry .

## 2021-08-10 NOTE — ASSESSMENT
[FreeTextEntry1] : The patient has had issues with anxiety and panic attacks . He has had a complete cardiac wortk up . His heart is structurally normal. He had a normal cardiac MRI and CTA of the heart including coronary artereis . The patient has long standing history of this and is under the care deborah psychiatry . He has seen EP was well.

## 2021-08-13 ENCOUNTER — APPOINTMENT (OUTPATIENT)
Dept: PSYCHIATRY | Facility: CLINIC | Age: 31
End: 2021-08-13

## 2021-08-20 ENCOUNTER — APPOINTMENT (OUTPATIENT)
Dept: PSYCHIATRY | Facility: CLINIC | Age: 31
End: 2021-08-20

## 2021-09-02 ENCOUNTER — NON-APPOINTMENT (OUTPATIENT)
Age: 31
End: 2021-09-02

## 2021-09-19 ENCOUNTER — EMERGENCY (EMERGENCY)
Facility: HOSPITAL | Age: 31
LOS: 0 days | Discharge: HOME | End: 2021-09-19
Attending: EMERGENCY MEDICINE | Admitting: EMERGENCY MEDICINE
Payer: MEDICAID

## 2021-09-19 VITALS
WEIGHT: 164.91 LBS | HEART RATE: 95 BPM | OXYGEN SATURATION: 100 % | TEMPERATURE: 98 F | SYSTOLIC BLOOD PRESSURE: 155 MMHG | HEIGHT: 70 IN | DIASTOLIC BLOOD PRESSURE: 89 MMHG | RESPIRATION RATE: 17 BRPM

## 2021-09-19 VITALS — WEIGHT: 164.91 LBS

## 2021-09-19 DIAGNOSIS — Z87.19 PERSONAL HISTORY OF OTHER DISEASES OF THE DIGESTIVE SYSTEM: ICD-10-CM

## 2021-09-19 DIAGNOSIS — R09.89 OTHER SPECIFIED SYMPTOMS AND SIGNS INVOLVING THE CIRCULATORY AND RESPIRATORY SYSTEMS: ICD-10-CM

## 2021-09-19 DIAGNOSIS — F41.9 ANXIETY DISORDER, UNSPECIFIED: ICD-10-CM

## 2021-09-19 DIAGNOSIS — R13.10 DYSPHAGIA, UNSPECIFIED: ICD-10-CM

## 2021-09-19 DIAGNOSIS — I10 ESSENTIAL (PRIMARY) HYPERTENSION: ICD-10-CM

## 2021-09-19 DIAGNOSIS — Z82.49 FAMILY HISTORY OF ISCHEMIC HEART DISEASE AND OTHER DISEASES OF THE CIRCULATORY SYSTEM: ICD-10-CM

## 2021-09-19 DIAGNOSIS — K21.9 GASTRO-ESOPHAGEAL REFLUX DISEASE WITHOUT ESOPHAGITIS: ICD-10-CM

## 2021-09-19 DIAGNOSIS — R07.89 OTHER CHEST PAIN: ICD-10-CM

## 2021-09-19 PROCEDURE — 70360 X-RAY EXAM OF NECK: CPT | Mod: 26

## 2021-09-19 PROCEDURE — 99284 EMERGENCY DEPT VISIT MOD MDM: CPT

## 2021-09-19 RX ORDER — FAMOTIDINE 10 MG/ML
20 INJECTION INTRAVENOUS ONCE
Refills: 0 | Status: COMPLETED | OUTPATIENT
Start: 2021-09-19 | End: 2021-09-19

## 2021-09-19 RX ORDER — DIPHENHYDRAMINE HYDROCHLORIDE AND LIDOCAINE HYDROCHLORIDE AND ALUMINUM HYDROXIDE AND MAGNESIUM HYDRO
30 KIT ONCE
Refills: 0 | Status: COMPLETED | OUTPATIENT
Start: 2021-09-19 | End: 2021-09-19

## 2021-09-19 RX ADMIN — FAMOTIDINE 20 MILLIGRAM(S): 10 INJECTION INTRAVENOUS at 20:20

## 2021-09-19 RX ADMIN — DIPHENHYDRAMINE HYDROCHLORIDE AND LIDOCAINE HYDROCHLORIDE AND ALUMINUM HYDROXIDE AND MAGNESIUM HYDRO 30 MILLILITER(S): KIT at 20:20

## 2021-09-19 NOTE — ED ADULT NURSE NOTE - NSICDXPASTMEDICALHX_GEN_ALL_CORE_FT
PAST MEDICAL HISTORY:  Anxiety     Hernia     Hypertension     Palpitations     Sinusitis     Vertigo

## 2021-09-19 NOTE — ED ADULT NURSE NOTE - OBJECTIVE STATEMENT
pt presents with throat pain x few weeks worsening after eating a sausage today. No drooling or respiratory distress noted. pt was able to eat watermelon after that sausage today , denies nausea, or vomiting. pt with hx. GERD, and hernia on antacid

## 2021-09-19 NOTE — ED ADULT TRIAGE NOTE - CHIEF COMPLAINT QUOTE
Pt thinks he has a piece of sausage stuck in the back of his throat, pt ate food at 1700 tonight. Pt states he has been having problems swallowing for about a month. No respiratory distress.

## 2021-09-19 NOTE — ED PROVIDER NOTE - CARE PROVIDER_API CALL
Tyson Chinchilla)  Gastroenterology  [address] [place] [address]  Phone: [phone]  Fax: [phone]  Follow Up Time:     Teo Ferrell  GASTROENTEROLOGY  [address] [place] [address]  Phone: [phone]  Fax: [phone]  Follow Up Time:

## 2021-09-19 NOTE — ED PROVIDER NOTE - NSFOLLOWUPINSTRUCTIONS_ED_ALL_ED_FT
Dysphagia    Swallowing problems (dysphagia) occur when solids and liquids seem to stick in your throat on the way down to your stomach, or the food takes longer to get to the stomach. Other symptoms include regurgitating food, noises coming from the throat, chest discomfort with swallowing, and a feeling of fullness or the feeling of something being stuck in your throat when swallowing. When blockage in your throat is complete, it may be associated with drooling.    CAUSES  Problems with swallowing may occur because of problems with the muscles. The food cannot be propelled in the usual manner into your stomach. You may have ulcers, scar tissue, or inflammation in the tube down which food travels from your mouth to your stomach (esophagus), which blocks food from passing normally into the stomach. Causes of inflammation include:    Acid reflux from your stomach into your esophagus.  Infection.  Radiation treatment for cancer.  Medicines taken without enough fluids to wash them down into your stomach.    You may have nerve problems that prevent signals from being sent to the muscles of your esophagus to contract and move your food down to your stomach. Globus pharyngeus is a relatively common problem in which there is a sense of an obstruction or difficulty in swallowing, without any physical abnormalities of the swallowing passages being found. This problem usually improves over time with reassurance and testing to rule out other causes.    DIAGNOSIS  Dysphagia can be diagnosed and its cause can be determined by tests in which you swallow a white substance that helps illuminate the inside of your throat (contrast medium) while X-rays are taken. Sometimes a flexible telescope that is inserted down your throat (endoscopy) to look at your esophagus and stomach is used.    TREATMENT  If the dysphagia is caused by acid reflux or infection, medicines may be used.  If the dysphagia is caused by problems with your swallowing muscles, swallowing therapy may be used to help you strengthen your swallowing muscles.  If the dysphagia is caused by a blockage or mass, procedures to remove the blockage may be done.    HOME CARE INSTRUCTIONS  Try to eat soft food that is easier to swallow and check your weight on a daily basis to be sure that it is not decreasing.  Be sure to drink liquids when sitting upright (not lying down).    SEEK MEDICAL CARE IF:  You are losing weight because you are unable to swallow.  You are coughing when you drink liquids (aspiration).  You are coughing up partially digested food.    SEEK IMMEDIATE MEDICAL CARE IF:  You are unable to swallow your own saliva?.  You are having shortness of breath or a fever, or both.?  You have a hoarse voice along with difficulty swallowing.    MAKE SURE YOU:  Understand these instructions.  Will watch your condition.  Will get help right away if you are not doing well or get worse.    ADDITIONAL NOTES AND INSTRUCTIONS    Please follow up with your Primary MD in 24-48 hr.  Seek immediate medical care for any new/worsening signs or symptoms.

## 2021-09-19 NOTE — ED PROVIDER NOTE - ATTENDING CONTRIBUTION TO CARE
I personally evaluated the patient. I reviewed the Resident’s or Physician Assistant’s note (as assigned above), and agree with the findings and plan except as documented in my note.  31 yo man with difficulty swallowing that has been happening for quite a while now since the a recent time when he swallowed a large piece of sausage.  He has been seen by gastroenterology who he felt dismissed his symptoms and did not take him seriously.  However, he is swallowing liquids and also his own saliva.  He is not having any airway issues.  I feel he may have achalasia or some sort of esophageal issue, but he is not at risk of dehydration or any other major issue and is stable for discharge and outpatient EGD follow up.

## 2021-09-19 NOTE — ED PROVIDER NOTE - CARE PROVIDERS DIRECT ADDRESSES
,[email or web address]

## 2021-09-19 NOTE — ED PROVIDER NOTE - OBJECTIVE STATEMENT
29 yo male with a pmh of hiatal hernia and GERD presents c/o FB sensation to throat after eating sausage. pt denies any SOB, cough, difficulty swallowing. pt states that he feels irritation to his throat when he eats/drinks since having the sausage for dinner. pt denies any other symptoms including fevers, chill, headache, recent illness/travel, cough, abdominal pain, chest pain, or SOB.

## 2021-09-19 NOTE — ED ADULT NURSE NOTE - NSICDXFAMILYHX_GEN_ALL_CORE_FT
FAMILY HISTORY:  Mother  Still living? Yes, Estimated age: Age Unknown  Family history of MI (myocardial infarction), Age at diagnosis: Age Unknown

## 2021-09-19 NOTE — ED PROVIDER NOTE - NS ED ROS FT
Constitutional: (-) fever  Eyes/ENT: FB throat sensation, (-) visual changes   Cardiovascular: (-) chest pain, (-) syncope  Respiratory: (-) cough, (-) shortness of breath  Gastrointestinal: (-) vomiting, (-) diarrhea  Genitourinary: (-) dysuria, (-) hesitancy, (-) frequency   Musculoskeletal: (-) neck pain, (-) back pain, (-) joint pain  Integumentary: (-) rash, (-) edema  Neurological: (-) headache, (-) altered mental status  Allergic/Immunologic: (-) pruritus

## 2021-09-19 NOTE — ED PROVIDER NOTE - PATIENT PORTAL LINK FT
You can access the FollowMyHealth Patient Portal offered by [facility] by registering at the following website: [email or web address]. By joining [employer]’s FollowMyHealth portal, you will also be able to view your health information using other applications (apps) compatible with our system.

## 2021-10-13 ENCOUNTER — APPOINTMENT (OUTPATIENT)
Dept: CARDIOLOGY | Facility: CLINIC | Age: 31
End: 2021-10-13

## 2021-10-15 ENCOUNTER — EMERGENCY (EMERGENCY)
Facility: HOSPITAL | Age: 31
LOS: 0 days | Discharge: HOME | End: 2021-10-15
Attending: EMERGENCY MEDICINE | Admitting: EMERGENCY MEDICINE
Payer: MEDICAID

## 2021-10-15 VITALS — RESPIRATION RATE: 18 BRPM | HEIGHT: 70 IN | TEMPERATURE: 98 F | OXYGEN SATURATION: 98 %

## 2021-10-15 DIAGNOSIS — R07.0 PAIN IN THROAT: ICD-10-CM

## 2021-10-15 DIAGNOSIS — R13.10 DYSPHAGIA, UNSPECIFIED: ICD-10-CM

## 2021-10-15 DIAGNOSIS — I10 ESSENTIAL (PRIMARY) HYPERTENSION: ICD-10-CM

## 2021-10-15 LAB
ALBUMIN SERPL ELPH-MCNC: 5.2 G/DL — SIGNIFICANT CHANGE UP (ref 3.5–5.2)
ALP SERPL-CCNC: 78 U/L — SIGNIFICANT CHANGE UP (ref 30–115)
ALT FLD-CCNC: 24 U/L — SIGNIFICANT CHANGE UP (ref 0–41)
ANION GAP SERPL CALC-SCNC: 18 MMOL/L — HIGH (ref 7–14)
AST SERPL-CCNC: 19 U/L — SIGNIFICANT CHANGE UP (ref 0–41)
BASOPHILS # BLD AUTO: 0.02 K/UL — SIGNIFICANT CHANGE UP (ref 0–0.2)
BASOPHILS NFR BLD AUTO: 0.3 % — SIGNIFICANT CHANGE UP (ref 0–1)
BILIRUB SERPL-MCNC: 1.1 MG/DL — SIGNIFICANT CHANGE UP (ref 0.2–1.2)
BUN SERPL-MCNC: 9 MG/DL — LOW (ref 10–20)
CALCIUM SERPL-MCNC: 10.5 MG/DL — HIGH (ref 8.5–10.1)
CHLORIDE SERPL-SCNC: 102 MMOL/L — SIGNIFICANT CHANGE UP (ref 98–110)
CO2 SERPL-SCNC: 24 MMOL/L — SIGNIFICANT CHANGE UP (ref 17–32)
CREAT SERPL-MCNC: 1 MG/DL — SIGNIFICANT CHANGE UP (ref 0.7–1.5)
EOSINOPHIL # BLD AUTO: 0.02 K/UL — SIGNIFICANT CHANGE UP (ref 0–0.7)
EOSINOPHIL NFR BLD AUTO: 0.3 % — SIGNIFICANT CHANGE UP (ref 0–8)
GLUCOSE SERPL-MCNC: 84 MG/DL — SIGNIFICANT CHANGE UP (ref 70–99)
HCT VFR BLD CALC: 44.8 % — SIGNIFICANT CHANGE UP (ref 42–52)
HGB BLD-MCNC: 16.1 G/DL — SIGNIFICANT CHANGE UP (ref 14–18)
HIV 1 & 2 AB SERPL IA.RAPID: SIGNIFICANT CHANGE UP
IMM GRANULOCYTES NFR BLD AUTO: 0.3 % — SIGNIFICANT CHANGE UP (ref 0.1–0.3)
LYMPHOCYTES # BLD AUTO: 1.82 K/UL — SIGNIFICANT CHANGE UP (ref 1.2–3.4)
LYMPHOCYTES # BLD AUTO: 26.3 % — SIGNIFICANT CHANGE UP (ref 20.5–51.1)
MCHC RBC-ENTMCNC: 29.2 PG — SIGNIFICANT CHANGE UP (ref 27–31)
MCHC RBC-ENTMCNC: 35.9 G/DL — SIGNIFICANT CHANGE UP (ref 32–37)
MCV RBC AUTO: 81.3 FL — SIGNIFICANT CHANGE UP (ref 80–94)
MONOCYTES # BLD AUTO: 0.47 K/UL — SIGNIFICANT CHANGE UP (ref 0.1–0.6)
MONOCYTES NFR BLD AUTO: 6.8 % — SIGNIFICANT CHANGE UP (ref 1.7–9.3)
NEUTROPHILS # BLD AUTO: 4.56 K/UL — SIGNIFICANT CHANGE UP (ref 1.4–6.5)
NEUTROPHILS NFR BLD AUTO: 66 % — SIGNIFICANT CHANGE UP (ref 42.2–75.2)
NRBC # BLD: 0 /100 WBCS — SIGNIFICANT CHANGE UP (ref 0–0)
PLATELET # BLD AUTO: 282 K/UL — SIGNIFICANT CHANGE UP (ref 130–400)
POTASSIUM SERPL-MCNC: 3.8 MMOL/L — SIGNIFICANT CHANGE UP (ref 3.5–5)
POTASSIUM SERPL-SCNC: 3.8 MMOL/L — SIGNIFICANT CHANGE UP (ref 3.5–5)
PROT SERPL-MCNC: 7.5 G/DL — SIGNIFICANT CHANGE UP (ref 6–8)
RBC # BLD: 5.51 M/UL — SIGNIFICANT CHANGE UP (ref 4.7–6.1)
RBC # FLD: 12.8 % — SIGNIFICANT CHANGE UP (ref 11.5–14.5)
SODIUM SERPL-SCNC: 144 MMOL/L — SIGNIFICANT CHANGE UP (ref 135–146)
WBC # BLD: 6.91 K/UL — SIGNIFICANT CHANGE UP (ref 4.8–10.8)
WBC # FLD AUTO: 6.91 K/UL — SIGNIFICANT CHANGE UP (ref 4.8–10.8)

## 2021-10-15 PROCEDURE — 99284 EMERGENCY DEPT VISIT MOD MDM: CPT

## 2021-10-15 RX ORDER — SODIUM CHLORIDE 9 MG/ML
1000 INJECTION, SOLUTION INTRAVENOUS ONCE
Refills: 0 | Status: COMPLETED | OUTPATIENT
Start: 2021-10-15 | End: 2021-10-15

## 2021-10-15 RX ORDER — BENZOCAINE 10 %
1 GEL (GRAM) MUCOUS MEMBRANE ONCE
Refills: 0 | Status: COMPLETED | OUTPATIENT
Start: 2021-10-15 | End: 2021-10-15

## 2021-10-15 RX ORDER — FLUCONAZOLE 150 MG/1
1 TABLET ORAL
Qty: 14 | Refills: 0
Start: 2021-10-15 | End: 2021-10-28

## 2021-10-15 RX ORDER — FLUCONAZOLE 150 MG/1
200 TABLET ORAL ONCE
Refills: 0 | Status: COMPLETED | OUTPATIENT
Start: 2021-10-15 | End: 2021-10-15

## 2021-10-15 RX ORDER — LIDOCAINE HCL 20 MG/ML
4 VIAL (ML) INJECTION ONCE
Refills: 0 | Status: COMPLETED | OUTPATIENT
Start: 2021-10-15 | End: 2021-10-15

## 2021-10-15 RX ADMIN — SODIUM CHLORIDE 1000 MILLILITER(S): 9 INJECTION, SOLUTION INTRAVENOUS at 20:35

## 2021-10-15 RX ADMIN — Medication 4 MILLILITER(S): at 19:09

## 2021-10-15 RX ADMIN — FLUCONAZOLE 200 MILLIGRAM(S): 150 TABLET ORAL at 20:34

## 2021-10-15 NOTE — ED PROVIDER NOTE - CARE PROVIDER_API CALL
Kan Flores)  Otolaryngology  [address] [place] [address], [address] [place] [address]  Phone: [phone]  Fax: [phone]  Follow Up Time: 1-3 Days

## 2021-10-15 NOTE — ED PROVIDER NOTE - ATTENDING CONTRIBUTION TO CARE
I personally evaluated patient. I agree with the findings and plan with all documentation on chart except as documented  in my note.    Patient has multiple visits for painful swallowing. He does not know if he had a piece of watermelon stuck today. He has had swallowing studies, and upper endoscopy, work up by his PMD (e.g. thyroid studies, Sjorgens)    Patient non-toxic appearing. ED work up essentially negative. Patient had endonasal exam that did not show any Foreign body or masses. Possible thrush to throat. Will treat.  Full DC instructions discussed and patient knows when to seek immediate medical attention.  Patient has proper follow up.  All results discussed and patient aware they may require further work up.  Proper follow up ensured. Limitations of ED work up discussed.  Medications administered and prescribed/OTC home meds discussed.  All questions and concerns from patient or family addressed. Understanding of instructions verbalized.

## 2021-10-15 NOTE — ED PROVIDER NOTE - CLINICAL SUMMARY MEDICAL DECISION MAKING FREE TEXT BOX
Patient has multiple visits for painful swallowing. He does not know if he had a piece of watermelon stuck today. He has had swallowing studies, and upper endoscopy, work up by his PMD (e.g. thyroid studies, Sjorgens)    Patient non-toxic appearing. ED work up essentially negative. Patient had endonasal exam that did not show any Foreign body or masses. Possible thrush to throat. Will treat.  Full DC instructions discussed and patient knows when to seek immediate medical attention.  Patient has proper follow up.  All results discussed and patient aware they may require further work up.  Proper follow up ensured. Limitations of ED work up discussed.  Medications administered and prescribed/OTC home meds discussed.  All questions and concerns from patient or family addressed. Understanding of instructions verbalized.

## 2021-10-15 NOTE — ED PROVIDER NOTE - ADDITIONAL NOTES AND INSTRUCTIONS:
Dear Provider,  Mr. Fuchs was seen and evaluated for continued painful swallowing and foreign body sensation. Earlier this year he had an endoscopy performed and he had work up by 2 different GI doctors. His recent blood work was reviewed and his blood work done here was normal.    We performed a nasal endoscopy in the Emergency department. There were no visualized masses or foreign bodies. His epiglottis and vocal cords appeared normal. He was found to have diffuse white plaques and irritation, which may be related to a candida infection. We advised a 2 week treatment with Fluconazole given his pain and symptoms. We are recommending viscous lidocaine prn and he is going to follow up with his doctor and Gastroenterology. He was given fluids and his blood work was normal. His results from [facility] are attached.     Thanks, Dr. Presley

## 2021-10-15 NOTE — ED PROVIDER NOTE - PATIENT PORTAL LINK FT
You can access the FollowMyHealth Patient Portal offered by [facility] by registering at the following website: [email or web address]. By joining [facility]’s FollowMyHealth portal, you will also be able to view your health information using other applications (apps) compatible with our system.

## 2021-10-15 NOTE — ED PROVIDER NOTE - PHYSICAL EXAMINATION
Physical Exam    Vital Signs: I have reviewed the initial vital signs.  Constitutional: well-nourished, appears stated age, no acute distress  Eyes: Conjunctiva pink, Sclera clear,  ENT: OP is clear without exudates, normal dentition, normal gingival, tongue without swelling, TMs clear b/l, nasal turbinates without erythema or discharge, no lymphadenopathy.  Cardiovascular: S1 and S2, regular rate, regular rhythm, well-perfused extremities, radial pulses equal and 2+  Respiratory: unlabored respiratory effort, clear to auscultation bilaterally no wheezing, rales and rhonchi  Gastrointestinal: soft, non-tender abdomen, no pulsatile mass, normal bowl sounds  Musculoskeletal: supple neck, no lower extremity edema, no midline tenderness  Integumentary: warm, dry, no rash  Neurologic: awake, alert, nvi

## 2021-10-15 NOTE — ED PROVIDER NOTE - OBJECTIVE STATEMENT
30 yo male, no pmh, presents to ed for difficulty swallowing, pt states issue is ongoing for 4 months, has f/u, notes discomfort to right side of throat, mild, aching, no radiation. denies fever, chills, cp, sob, nvd, abd pain.

## 2021-10-15 NOTE — ED PROVIDER NOTE - NSFOLLOWUPINSTRUCTIONS_ED_ALL_ED_FT
Dysphagia    WHAT YOU NEED TO KNOW:    Dysphagia is trouble swallowing. It occurs when you have trouble moving food or liquid from your mouth to your esophagus or down to your stomach. It may occur when you eat, drink, or any time you try to swallow.    DISCHARGE INSTRUCTIONS:    Return to the emergency department if:   •You choke on your own saliva.       •You have chest pain.       •You have shortness of breath.      •You cannot eat or drink liquids at all.       Contact your healthcare provider if:   •You lose weight without trying.      •Your signs and symptoms get worse, or you have new signs or symptoms.      •You have signs or symptoms of dehydration, such as increased thirst, dark yellow urine, or little or no urine.      •You get colds often.      •You have questions or concerns about your condition or care.      Nutrition: You may need to change the texture of the foods you eat to help reduce choking problems. Your healthcare provider may show you how to thicken liquids or soften foods to make them easier to swallow.    Follow up with your healthcare provider as directed: Write down your questions so you remember to ask them during your visits. Oral Candidiasis    WHAT YOU NEED TO KNOW:    Oral candidiasis, or thrush, is a fungal infection that affects the inside of your mouth.     DISCHARGE INSTRUCTIONS:    Return to the emergency department if:   •You have trouble swallowing and your jaw and neck are stiff.      •You are dizzy, thirsty, or have a dry mouth.      •You are urinating little or not at all.      •You cannot eat or drink because of the pain.      Contact your healthcare provider if:   •You have a fever.      •You have nausea, vomiting, or diarrhea.      •Your signs and symptoms get worse, even after treatment.      •You have questions or concerns about your condition or care.      Medicines:   •Antifungal medicine helps kill the fungus that caused your oral candidiasis. This medicine may be a pill or a solution that you gargle. Remove dentures before you gargle.       •Take your medicine as directed. Contact your healthcare provider if you think your medicine is not helping or if you have side effects. Tell him of her if you are allergic to any medicine. Keep a list of the medicines, vitamins, and herbs you take. Include the amounts, and when and why you take them. Bring the list or the pill bottles to follow-up visits. Carry your medicine list with you in case of an emergency.      Prevent oral candidiasis: Brush your teeth, gums, and tongue after you eat and before you go to sleep. Use a toothbrush with soft bristles. See your dentist for regular exams. Remove your dentures when you sleep, or at least 6 hours each day. Clean your dentures and soak them in denture . Let them air dry after soaking.     Follow up with your doctor as directed: Write down your questions so you remember to ask them during your visits.               Dysphagia    WHAT YOU NEED TO KNOW:    Dysphagia is trouble swallowing. It occurs when you have trouble moving food or liquid from your mouth to your esophagus or down to your stomach. It may occur when you eat, drink, or any time you try to swallow.    DISCHARGE INSTRUCTIONS:    Return to the emergency department if:   •You choke on your own saliva.       •You have chest pain.       •You have shortness of breath.      •You cannot eat or drink liquids at all.       Contact your healthcare provider if:   •You lose weight without trying.      •Your signs and symptoms get worse, or you have new signs or symptoms.      •You have signs or symptoms of dehydration, such as increased thirst, dark yellow urine, or little or no urine.      •You get colds often.      •You have questions or concerns about your condition or care.      Nutrition: You may need to change the texture of the foods you eat to help reduce choking problems. Your healthcare provider may show you how to thicken liquids or soften foods to make them easier to swallow.    Follow up with your healthcare provider as directed: Write down your questions so you remember to ask them during your visits.

## 2021-10-15 NOTE — ED PROVIDER NOTE - NS ED ROS FT
Constitutional: (-) fever, (-) chills  Eyes: (-) visual changes, (+) difficulty swallowing   ENT: (-) nasal congestions  Cardiovascular: (-) chest pain, (-) syncope  Respiratory: (-) cough, (-) shortness of breath, (-) dyspnea,   Gastrointestinal: (-) vomiting, (-) diarrhea, (-)nausea,  Musculoskeletal: (-) neck pain, (-) back pain, (-) joint pain,  Integumentary: (-) rash, (-) edema, (-) bruises  Neurological: (-) headache, (-) loc, (-) dizziness, (-) tingling, (-)numbness  Peripheral Vascular: (-) leg swelling  :  (-)dysuria,  (-) hematuria  Allergic/Immunologic: (-) pruritus

## 2021-10-15 NOTE — ED ADULT TRIAGE NOTE - CHIEF COMPLAINT QUOTE
"I've been having issues swallowing. I'm seeing a gastro doctor, they put me on Prevacid and its making my mouth really dry. I ate some watermelon earlier and I don't know if its stuck." Patient states he also has a "lump" on the right side of his throat".

## 2021-10-21 ENCOUNTER — APPOINTMENT (OUTPATIENT)
Dept: OTOLARYNGOLOGY | Facility: CLINIC | Age: 31
End: 2021-10-21
Payer: MEDICAID

## 2021-10-21 DIAGNOSIS — J04.0 ACUTE LARYNGITIS: ICD-10-CM

## 2021-10-21 DIAGNOSIS — Z87.898 PERSONAL HISTORY OF OTHER SPECIFIED CONDITIONS: ICD-10-CM

## 2021-10-21 PROCEDURE — 31575 DIAGNOSTIC LARYNGOSCOPY: CPT

## 2021-10-21 PROCEDURE — 99204 OFFICE O/P NEW MOD 45 MIN: CPT | Mod: 25

## 2021-10-21 NOTE — HISTORY OF PRESENT ILLNESS
[de-identified] : Patient presents today with c/o dysphagia. Patient admits having swallowing issues for about 3 months. Recently seen in the ER told to have candida- put on medication with no improvement. Patient unable to eat. He c/o thick mucus in his mouth. Patient loosing weight. No issues with liquids. Feels food stuck in his throat. Scratching his throat. Patient c/o dry mouth. \par \par Patient also c/o deviated septum. Not really breathing through his nose. He admits h/o nasal fracture about 10 years ago. No snoring at night. Breathing through his mouth.

## 2021-10-22 ENCOUNTER — NON-APPOINTMENT (OUTPATIENT)
Age: 31
End: 2021-10-22

## 2021-11-01 ENCOUNTER — APPOINTMENT (OUTPATIENT)
Dept: NEUROLOGY | Facility: CLINIC | Age: 31
End: 2021-11-01

## 2021-11-02 ENCOUNTER — OUTPATIENT (OUTPATIENT)
Dept: OUTPATIENT SERVICES | Facility: HOSPITAL | Age: 31
LOS: 1 days | Discharge: HOME | End: 2021-11-02

## 2021-11-02 DIAGNOSIS — R13.10 DYSPHAGIA, UNSPECIFIED: ICD-10-CM

## 2021-11-08 ENCOUNTER — APPOINTMENT (OUTPATIENT)
Dept: OTOLARYNGOLOGY | Facility: CLINIC | Age: 31
End: 2021-11-08
Payer: MEDICAID

## 2021-11-08 DIAGNOSIS — M62.81 MUSCLE WEAKNESS (GENERALIZED): ICD-10-CM

## 2021-11-08 DIAGNOSIS — J34.89 OTHER SPECIFIED DISORDERS OF NOSE AND NASAL SINUSES: ICD-10-CM

## 2021-11-08 DIAGNOSIS — B37.89 OTHER SITES OF CANDIDIASIS: ICD-10-CM

## 2021-11-08 PROCEDURE — 69210 REMOVE IMPACTED EAR WAX UNI: CPT

## 2021-11-08 PROCEDURE — 31575 DIAGNOSTIC LARYNGOSCOPY: CPT

## 2021-11-08 PROCEDURE — 99214 OFFICE O/P EST MOD 30 MIN: CPT | Mod: 25

## 2021-11-08 NOTE — REASON FOR VISIT
[Subsequent Evaluation] : a subsequent evaluation for [FreeTextEntry2] : dysphagia and nasal obstruction

## 2021-11-08 NOTE — PROCEDURE
[Dysphagia] : dysphagia not clearly evaluated by indirect laryngoscopy [Complicated Symptoms] : complicated symptoms requiring more thorough examination than provided by mirror [Flexible Endoscope] : examined with the flexible endoscope [Normal] : posterior cricoid area had healthy pink mucosa in the interarytenoid area and the esophageal inlet [de-identified] : plagues on base of tongue

## 2021-11-08 NOTE — HISTORY OF PRESENT ILLNESS
[FreeTextEntry1] : Patient following up on dysphagia and nasal obstruction.  Had Barium swallow performed , here to discuss results. No medication   taken since last visit.  Has seen Gastro since last visit, have endoscopy performed. Would like to be scoped. Pt had labs performed and all were reviewed and were WNL.   Barium swallow result shows pahryngeal weakness.

## 2021-11-09 ENCOUNTER — APPOINTMENT (OUTPATIENT)
Dept: NEUROLOGY | Facility: CLINIC | Age: 31
End: 2021-11-09
Payer: MEDICAID

## 2021-11-09 ENCOUNTER — NON-APPOINTMENT (OUTPATIENT)
Age: 31
End: 2021-11-09

## 2021-11-09 VITALS
SYSTOLIC BLOOD PRESSURE: 160 MMHG | HEART RATE: 114 BPM | HEIGHT: 70 IN | BODY MASS INDEX: 22.9 KG/M2 | WEIGHT: 160 LBS | DIASTOLIC BLOOD PRESSURE: 95 MMHG | TEMPERATURE: 98.1 F | OXYGEN SATURATION: 98 %

## 2021-11-09 DIAGNOSIS — Z86.79 PERSONAL HISTORY OF OTHER DISEASES OF THE CIRCULATORY SYSTEM: ICD-10-CM

## 2021-11-09 PROCEDURE — 99204 OFFICE O/P NEW MOD 45 MIN: CPT

## 2021-11-09 RX ORDER — METOPROLOL SUCCINATE 50 MG/1
50 TABLET, EXTENDED RELEASE ORAL
Qty: 90 | Refills: 0 | Status: DISCONTINUED | COMMUNITY
Start: 2020-11-30

## 2021-11-09 RX ORDER — FLUCONAZOLE 100 MG/1
100 TABLET ORAL
Qty: 14 | Refills: 0 | Status: DISCONTINUED | COMMUNITY
Start: 2021-10-15

## 2021-11-09 RX ORDER — DEXLANSOPRAZOLE 60 MG/1
60 CAPSULE, DELAYED RELEASE ORAL DAILY
Qty: 30 | Refills: 6 | Status: DISCONTINUED | COMMUNITY
Start: 2021-10-21 | End: 2021-11-09

## 2021-11-09 RX ORDER — LANSOPRAZOLE 30 MG/1
30 CAPSULE, DELAYED RELEASE ORAL DAILY
Refills: 0 | Status: DISCONTINUED | COMMUNITY
End: 2021-11-09

## 2021-11-09 RX ORDER — BUSPIRONE HYDROCHLORIDE 5 MG/1
5 TABLET ORAL
Qty: 90 | Refills: 0 | Status: COMPLETED | COMMUNITY
Start: 2021-05-10

## 2021-11-09 RX ORDER — FAMOTIDINE 40 MG/1
40 TABLET, FILM COATED ORAL
Qty: 30 | Refills: 0 | Status: DISCONTINUED | COMMUNITY
Start: 2021-10-11

## 2021-11-09 RX ORDER — OMEPRAZOLE 40 MG/1
40 CAPSULE, DELAYED RELEASE ORAL
Qty: 30 | Refills: 0 | Status: DISCONTINUED | COMMUNITY
Start: 2021-10-01

## 2021-11-09 RX ORDER — AZELASTINE HYDROCHLORIDE 137 UG/1
0.1 SPRAY, METERED NASAL
Refills: 0 | Status: DISCONTINUED | COMMUNITY
End: 2021-11-09

## 2021-11-09 NOTE — PHYSICAL EXAM
[FreeTextEntry1] : MS: Awake, alert, oriented to person, place, situation and time. Follows commands. \par \par Language: Speech is clear, fluent. No dysarthria. \par \par CNs 2 - 12 intact. EOMI no nystagmus, no diplopia. No facial asymmetry b/l. Tongue midline, normal movements, no atrophy. Normal funduscopy. \par \par Motor: Normal muscle bulk & tone. No noticeable tremor or seizure. No pronator drift. Muscle strength of b/l UE and b/l LE 5/5. Including neck flexures and extensors. \par \par Sensation: Intact to LT b/l throughout\par \par Cortical: No extinction\par \par Coordination: Intact rapid-alternating movements. No dysmetria to finger to nose. \par \par DTR: 2+ in biceps, brachioradialis and triceps; 2+ in patellar and ankle; plantars are down b/l\par \par Gait: No postural instability. Normal stance and tandem gait.\par \par General: Alert and oriented to person, place, time, and situation. In no acute distress. Cooperative. Follows commands. \par Eyes: Sclera and conjunctiva were normal and pupils were equal in size, round, reactive to light, with normal accommodation\par ENT: Ears and nose normal in appearance. \par Vascular: No peripheral edema.\par Respiratory: No visible respiratory distress. \par Musculoskeletal: No involuntary movements were seen.\par Skin: Normal skin color and pigmentation.

## 2021-11-09 NOTE — HISTORY OF PRESENT ILLNESS
[FreeTextEntry1] : This 31 y old male, presenting with dysphagia\par This started on July, suddenly, one morning out of no where felt scratchy and dry tongue with thick saliva and difficulty swallowing. It is 24/7 now, he was able to able solid meals but now only liquids, he is described it as no saliva to help him swallow. No difference during the day. \par No history of double vision, no blurry vision, no weakness or difficulty of getting up or down stairs. \par No droopy eyelids. \par No family history of muscle disease or neurological disease, his grandfather had AD and PD, mother has hearing loss (her age 69 y)\par He was on nasal steroids for a long period.

## 2021-11-09 NOTE — ASSESSMENT
[FreeTextEntry1] : This 31 y old male, has dysphagia. His ENT doctor diagnosed throat thrush recently and he is on treatment for that. He had nasal steroid spray for a long period, which make it possible to have thrush\par However, the weakness seen on the scope is not explained with that diagnosis\par \par Plan:\par - Myasthenia gravis panel \par - CPK\par - F/U 4 months \par

## 2021-11-09 NOTE — END OF VISIT
[] : Resident [FreeTextEntry3] : Patient examined.  Has isolated pharyngeal weakness and dry mouth.  General neurologic exam is unremarkable.  Will send for myasthenia panel though nothing on general exam shows weakness.  Also check CPK, though no family hx of muscular dystrophy.\par Neck flexion/extension seem full strength.\par Return in 3-4 months or sooner if needed.  Patient reports negative brain imaging a year ago.  If patient progresses instead of improves and other testing is negative, can repeat brain MRI with/without contrast.\par Discussed plan with patient and he was in agreement.

## 2021-11-10 ENCOUNTER — APPOINTMENT (OUTPATIENT)
Dept: CARDIOLOGY | Facility: CLINIC | Age: 31
End: 2021-11-10
Payer: MEDICAID

## 2021-11-10 VITALS
SYSTOLIC BLOOD PRESSURE: 142 MMHG | HEIGHT: 70 IN | BODY MASS INDEX: 22.9 KG/M2 | TEMPERATURE: 97.2 F | DIASTOLIC BLOOD PRESSURE: 90 MMHG | WEIGHT: 160 LBS | OXYGEN SATURATION: 99 % | HEART RATE: 91 BPM

## 2021-11-10 PROCEDURE — 99213 OFFICE O/P EST LOW 20 MIN: CPT

## 2021-11-10 PROCEDURE — 93000 ELECTROCARDIOGRAM COMPLETE: CPT

## 2021-11-10 RX ORDER — NYSTATIN 100000 [USP'U]/ML
100000 SUSPENSION ORAL
Qty: 473 | Refills: 0 | Status: DISCONTINUED | COMMUNITY
Start: 2021-10-18 | End: 2021-11-10

## 2021-11-10 RX ORDER — FLUCONAZOLE 100 MG/1
100 TABLET ORAL DAILY
Qty: 30 | Refills: 0 | Status: DISCONTINUED | COMMUNITY
Start: 2021-11-09 | End: 2021-11-10

## 2021-12-29 NOTE — ASSESSMENT
[FreeTextEntry1] : ## Palpitations\par \par - Event monitor showed no sustained arrhythmias. There were 54 symptomatic event which are suggestive of sinus tachycardia. \par - In this scenario, there is no further EP work-up needed\par - Work-up for sinus tachycardia as per primary team, Behav team and cardio.\par - Will wean patient off metoprolol. To reduce dose into half for 3 days - followed by discontinue.\par - Will sign-off.\par - Return as needed.

## 2021-12-29 NOTE — HISTORY OF PRESENT ILLNESS
[FreeTextEntry1] : I had a pleasure of seeing Mr. HATCH for initial consultation for palpitations. \par \par Mr. HATCH is a 30 year-year old female with history of anxiety, HTN, DL is here for palpitations. \par \par had holter before- states that no symptoms at that time.\par \par 6/23: Continues to have symptoms. \par 11/10: ** Feels tired. \par \par Denies chest pain, shortness of breath, palpitation, dizziness or LOC except noted above.\par \par EKG (11/10): SR@91\par EKG (06/23): SR@ 102/min, MT: 162 ms, QRSd: 104 ms, QTc: 425 ms\par EKG: SR@ 86/min\par  Cardiac (12/20): Normal chambers, LVEF 57%\par TTE (08/20): Nl EF, 0.8 calcified structure contiguous with mitral chordae\par Cardio: Dr. Brown\par

## 2022-03-08 ENCOUNTER — APPOINTMENT (OUTPATIENT)
Dept: GASTROENTEROLOGY | Facility: CLINIC | Age: 32
End: 2022-03-08

## 2022-03-20 NOTE — ED ADULT NURSE NOTE - NSFALLRSKOUTCOME_ED_ALL_ED
Subjective: Patient seen and evaluated sitting up in chair.  Patient still adamant she would like to meet with hospice.  Meeting set up for Monday.  Patient has been declining pills, just wants things that make her comfortable.    No family or friends at the bedside.    Objective:   Mental Status: Alert and oriented.  Follows commands appropriately.  Cachectic looking    Vitals: Blood pressure (!) 146/76, pulse 91, temperature 97.3 °F (36.3 °C), temperature source Oral, resp. rate 16, height 5' (1.524 m), weight 51 kg (112 lb 7 oz), SpO2 95 %.     ENT: PERRL. EOM intact  Neck: Supple, No JVD  Respiratory: Equal air entry.  Coarse rhonchi to auscultation bilaterally  CV: S1, S2. RRR.  GI: Abdomen soft, nontender.  Active bowel sounds in all four quadrants.  Extremities: No peripheral edema.  Skin: Pink, warm and dry.  Neuro: Speech clear. Equal strength bilaterally.    Labs:   Recent Results (from the past 24 hour(s))   GLUCOSE, BEDSIDE - POINT OF CARE    Collection Time: 03/19/22 11:40 AM   Result Value Ref Range    GLUCOSE, BEDSIDE - POINT OF CARE 152 (H) 70 - 99 mg/dL        Assessment/ Plan:  Patient Active Problem List   Diagnosis       •  Acute hypoxic respiratory failure  Remains on 3 L of supplemental oxygen,      • Chronic hypercapnia  May require trilogy upon discharge versus hospice  Pulmonology following closely   • Pneumonia  Continue Mucinex and IV antibiotics if patient agreeable  Pulmonology following closely   • Atrial fibrillation with RVR  Heart rate well controlled  No anticoagulation  Cardiology following closely   • Hypertension  Continue current medications   • Hyperlipidemia  Continue statin   • Lung nodule  Patient does not want any aggressive treatment at this time  Continue to monitor   • History of macular degeneration  Patient is worried this might bleed, and is not on any anticoagulation due to this   • DVT prophylaxis  SCDs        Next Steps:  Hospice to meet with patient tomorrow,  patient is a full DNR  We will move forward with patient's comfort in mind.    Disposition pending patient and family's decision on hospice.    Pt. seen and evaluated and management undertaken in conjunction with Dr. Rich in person.    Valentina Mendoza, CNP    Universal Safety Interventions

## 2022-03-30 ENCOUNTER — OUTPATIENT (OUTPATIENT)
Dept: OUTPATIENT SERVICES | Facility: HOSPITAL | Age: 32
LOS: 1 days | Discharge: HOME | End: 2022-03-30

## 2022-03-30 DIAGNOSIS — R13.10 DYSPHAGIA, UNSPECIFIED: ICD-10-CM

## 2022-04-05 ENCOUNTER — APPOINTMENT (OUTPATIENT)
Dept: CARDIOLOGY | Facility: CLINIC | Age: 32
End: 2022-04-05

## 2022-04-14 NOTE — ED PROVIDER NOTE - OBJECTIVE STATEMENT
29 y/o m h/o hiatal hernia, GERD p/w esophageal/abd pain. Pt was bent over sitting on an exercise ball and twisted up abruptly, felt sudden popping sensation and abd being "pushed up" and a "gas pocket" develop. Was able to tolerate PO liquids after incident and again in the ED. Still feels sensation of gas pocket. Took lansoprazole/prevacid, TUMs before coming to ED.  Denies fevers/chills, n/v/d.    Accompanied by mom n/a

## 2022-04-26 NOTE — ED ADULT NURSE NOTE - CAS DISCH TRANSFER METHOD
Private car Fusiform Excision Additional Text (Leave Blank If You Do Not Want): The margin was drawn around the clinically apparent lesion.  A fusiform shape was then drawn on the skin incorporating the lesion and margins.  Incisions were then made along these lines to the appropriate tissue plane and the lesion was extirpated.

## 2022-05-16 NOTE — PHYSICAL EXAM
Order for a right knee MRI was signed.   Please fax.      TXS    Reji   [Nasal Endoscopy Performed] : nasal endoscopy was performed, see procedure section for findings [Normal] : mucosa is normal [Midline] : trachea located in midline position SHANELL Schofield

## 2022-06-03 NOTE — REVIEW OF SYSTEMS
Follow Up:      Interval History/ROS:  No fever/chills overnight    Allergies  No Known Allergies      ANTIMICROBIALS:  ceFAZolin   IVPB 1000 every 8 hours      OTHER MEDS:  MEDICATIONS  (STANDING):  allopurinol 100 daily  aMIOdarone    Tablet 200 daily  aspirin enteric coated 81 daily  atorvastatin 80 at bedtime  heparin   Injectable 5000 every 8 hours  hydrALAZINE 25 every 8 hours  influenza   Vaccine 0.5 once  insulin lispro (ADMELOG) corrective regimen sliding scale  three times a day before meals  insulin lispro (ADMELOG) corrective regimen sliding scale  at bedtime  metoprolol succinate ER    metoprolol succinate ER 25 daily  milrinone Infusion 0.5 <Continuous>  pantoprazole    Tablet 40 before breakfast  polyethylene glycol 3350 17 two times a day  senna 2 at bedtime      Vital Signs Last 24 Hrs  T(C): 36.5 (03 Jun 2022 07:59), Max: 36.5 (02 Jun 2022 20:32)  T(F): 97.7 (03 Jun 2022 07:45), Max: 97.7 (02 Jun 2022 20:32)  HR: 92 (03 Jun 2022 09:52) (82 - 98)  BP: 107/83 (03 Jun 2022 09:52) (94/73 - 127/89)  BP(mean): --  RR: 19 (03 Jun 2022 09:52) (17 - 20)  SpO2: 95% (03 Jun 2022 09:52) (94% - 100%)    PHYSICAL EXAM:  Constitutional: non-toxic, no distress  HEAD/EYES: anicteric, no conjunctival injection  ENT:  supple, no thrush  Cardiovascular:   +S1/S2  Respiratory:  +BS bilaterally  GI:  soft, non-tender, +bowel sounds  :  no block, no CVA tenderness  Musculoskeletal:  no synovitis, normal ROM  Neurologic: awake and alert,  no focal findings  Skin:  + phlebitis Rue                          16.4   15.47 )-----------( 347      ( 03 Jun 2022 06:04 )             50.0       06-03    138  |  101  |  22  ----------------------------<  78  4.4   |  25  |  1.25    Ca    9.9      03 Jun 2022 06:02      MICROBIOLOGY:  CMV IgG Antibody: >10.00 U/mL (05-27-22 @ 17:18)  Toxoplasma IgG Screen: <3.0 IU/mL (05-27-22 @ 17:18)  HIV-1 RNA Quantitative, Viral Load Log: NOT DET. lg /mL (05-27-22 @ 15:30)  Rapid RVP Result: NotDetec (05-28 @ 18:35)    RADIOLOGY:  < from: [facility] Physiol Extremity Lower 3+ Level, BI (05.31.22 @ 15:55) >  IMPRESSION: This is a normal examination.  < end of copied text >   [Shortness Of Breath] : shortness of breath [Palpitations] : palpitations [Chest Pain] : chest pain [Anxiety] : anxiety [Negative] : Endocrine

## 2022-06-20 ENCOUNTER — APPOINTMENT (OUTPATIENT)
Dept: NEUROLOGY | Facility: CLINIC | Age: 32
End: 2022-06-20

## 2022-07-07 ENCOUNTER — APPOINTMENT (OUTPATIENT)
Dept: GASTROENTEROLOGY | Facility: CLINIC | Age: 32
End: 2022-07-07

## 2022-07-07 VITALS
BODY MASS INDEX: 22.48 KG/M2 | HEART RATE: 88 BPM | TEMPERATURE: 98.1 F | WEIGHT: 157 LBS | DIASTOLIC BLOOD PRESSURE: 80 MMHG | HEIGHT: 70 IN | SYSTOLIC BLOOD PRESSURE: 140 MMHG

## 2022-07-07 DIAGNOSIS — Z83.79 FAMILY HISTORY OF OTHER DISEASES OF THE DIGESTIVE SYSTEM: ICD-10-CM

## 2022-07-07 DIAGNOSIS — Z78.9 OTHER SPECIFIED HEALTH STATUS: ICD-10-CM

## 2022-07-07 DIAGNOSIS — Z87.09 PERSONAL HISTORY OF OTHER DISEASES OF THE RESPIRATORY SYSTEM: ICD-10-CM

## 2022-07-07 DIAGNOSIS — J39.2 OTHER DISEASES OF PHARYNX: ICD-10-CM

## 2022-07-07 DIAGNOSIS — Z87.19 PERSONAL HISTORY OF OTHER DISEASES OF THE DIGESTIVE SYSTEM: ICD-10-CM

## 2022-07-07 DIAGNOSIS — R13.10 DYSPHAGIA, UNSPECIFIED: ICD-10-CM

## 2022-07-07 PROCEDURE — 99204 OFFICE O/P NEW MOD 45 MIN: CPT

## 2022-07-07 RX ORDER — METOPROLOL SUCCINATE 25 MG/1
25 TABLET, EXTENDED RELEASE ORAL DAILY
Qty: 30 | Refills: 5 | Status: DISCONTINUED | COMMUNITY
End: 2022-07-07

## 2022-07-08 PROBLEM — Z87.09 HISTORY OF SINUSITIS: Status: RESOLVED | Noted: 2022-07-07 | Resolved: 2022-07-08

## 2022-07-08 PROBLEM — Z83.79 FAMILY HISTORY OF HEPATIC CIRRHOSIS: Status: ACTIVE | Noted: 2022-07-08

## 2022-07-08 PROBLEM — Z87.19 HISTORY OF GASTRITIS: Status: RESOLVED | Noted: 2022-07-07 | Resolved: 2022-07-08

## 2022-07-08 PROBLEM — Z78.9 CURRENT NON-SMOKER: Status: ACTIVE | Noted: 2022-07-07

## 2022-07-08 PROBLEM — Z87.09 HISTORY OF ALLERGIC RHINITIS: Status: RESOLVED | Noted: 2022-07-07 | Resolved: 2022-07-08

## 2022-07-08 PROBLEM — R13.10 DYSPHAGIA: Status: ACTIVE | Noted: 2021-10-21

## 2022-07-08 PROBLEM — J39.2 DRY THROAT: Status: ACTIVE | Noted: 2021-11-08

## 2022-07-08 PROBLEM — Z87.19 HISTORY OF HIATAL HERNIA: Status: RESOLVED | Noted: 2022-07-07 | Resolved: 2022-07-08

## 2022-07-08 PROBLEM — Z87.19 HISTORY OF GASTROESOPHAGEAL REFLUX (GERD): Status: RESOLVED | Noted: 2022-07-07 | Resolved: 2022-07-08

## 2022-07-08 NOTE — REVIEW OF SYSTEMS
[Fever] : no fever [Chills] : no chills [Eye Pain] : no eye pain [Earache] : no earache [Chest Pain] : no chest pain [Palpitations] : no palpitations [Lower Ext Edema] : no extremity edema [Cough] : no cough [SOB on Exertion] : no shortness of breath during exertion [Abdominal Pain] : no abdominal pain [Skin Lesions] : skin lesion [Confused] : no confusion [Suicidal] : not suicidal [Easy Bleeding] : no tendency for easy bleeding [Easy Bruising] : no tendency for easy bruising [de-identified] : acne

## 2022-07-08 NOTE — HISTORY OF PRESENT ILLNESS
[de-identified] : Patient has been having problems with dry mouth and was seen by rheumatology. Blood tests showed elevated liver tests with AST 66  in June 2022. Reports mild elevation of AST ALT in 50 - 60 range 7 - 8 months ago. Patient has been having swallowing issues. which occurred suddenly.  With dysphagia has been having shakes - with ensure. Did use whey poweder from Oct to Feb 2022.\par Underwent evaluation with EGD which showed hiatal hernia in March 2021 with esophagitis gastritis. EGD in Dec 2021 did not show any abnormality. \par No use of herbal supplemetns or weed. Was on Clindamycin Dec 2021 for suspected sinus infection. Wt has been stable for 6 months around 150 - 160. Was 240 lb 2 years ago.  [FreeTextEntry1] : No alcohol for past 2 -3 years. Prior social use.

## 2022-07-08 NOTE — ASSESSMENT
[FreeTextEntry1] : 31 year old  male with hx of sinusitis dry mouth dysphagia seen for elevated liver tests\par \par Elevated liver tests\par T bili 1.7 AST 66  ALP 92 Albumin 2.3 \par Normal protein electrophoresis IgG  TSH \par SAUNDRA ASMA complement DS DNA RA factor ESR negative \par HBsAg HBcAb total HCV ab negative \par Has been using diet with shakes \par Wt down from 240 lb 2 years ago to 160 lb\par Reports mild elevation in ALT 7 months ago. \par HBV HCV autoimmune testing negative\par Will repeat liver tests. Likely due to nutrition related issues. \par If still elevated will order complete CLD work up and consider liver biopsy in future. \par \par Health maintenance\par HCV negative\par if liver tests still elevated will check immune status for hepatitis A and B.

## 2022-07-08 NOTE — PHYSICAL EXAM
[Scleral Icterus] : No Scleral Icterus [Spider Angioma] : No spider angioma(s) were observed [Abdominal  Ascites] : no ascites [Splenomegaly] : no splenomegaly [Liver Palpable ___ Finger Breadths Below Costal Margin] : was not palpable below costal margin [Asterixis] : no asterixis observed [General Appearance - Alert] : alert [General Appearance - Well Nourished] : well nourished [General Appearance - Well Developed] : well developed [Sclera] : the sclera and conjunctiva were normal [Outer Ear] : the ears and nose were normal in appearance [Neck Cervical Mass (___cm)] : no neck mass was observed [Thyroid Diffuse Enlargement] : the thyroid was not enlarged [Heart Sounds] : normal S1 and S2 [Murmurs] : no murmurs [Edema] : there was no peripheral edema [Abdomen Tenderness] : non-tender [] : no hepato-splenomegaly [Nail Clubbing] : no clubbing  or cyanosis of the fingernails [FreeTextEntry1] : Acne scars back [No Focal Deficits] : no focal deficits [Oriented To Time, Place, And Person] : oriented to person, place, and time [Impaired Insight] : insight and judgment were intact [Affect] : the affect was normal [Mood] : the mood was normal

## 2022-07-20 NOTE — ED ADULT TRIAGE NOTE - CODE STROKE ACTIVE YN
No Adbry Counseling: I discussed with the patient the risks of tralokinumab including but not limited to eye infection and irritation, cold sores, injection site reactions, worsening of asthma, allergic reactions and increased risk of parasitic infection.  Live vaccines should be avoided while taking tralokinumab. The patient understands that monitoring is required and they must alert us or the primary physician if symptoms of infection or other concerning signs are noted.

## 2022-08-17 ENCOUNTER — APPOINTMENT (OUTPATIENT)
Dept: GASTROENTEROLOGY | Facility: CLINIC | Age: 32
End: 2022-08-17

## 2022-08-17 VITALS
WEIGHT: 139.6 LBS | HEIGHT: 70 IN | SYSTOLIC BLOOD PRESSURE: 133 MMHG | BODY MASS INDEX: 19.98 KG/M2 | HEART RATE: 93 BPM | DIASTOLIC BLOOD PRESSURE: 83 MMHG

## 2022-08-17 VITALS
WEIGHT: 157 LBS | HEIGHT: 70 IN | DIASTOLIC BLOOD PRESSURE: 87 MMHG | BODY MASS INDEX: 22.48 KG/M2 | SYSTOLIC BLOOD PRESSURE: 130 MMHG | HEART RATE: 98 BPM

## 2022-08-17 PROCEDURE — 99214 OFFICE O/P EST MOD 30 MIN: CPT

## 2022-08-17 NOTE — PHYSICAL EXAM
[General Appearance - Alert] : alert [General Appearance - Well Nourished] : well nourished [Sclera] : the sclera and conjunctiva were normal [Neck Cervical Mass (___cm)] : no neck mass was observed [Jugular Venous Distention Increased] : there was no jugular-venous distention [Thyroid Diffuse Enlargement] : the thyroid was not enlarged [Respiration, Rhythm And Depth] : normal respiratory rhythm and effort [Auscultation Breath Sounds / Voice Sounds] : lungs were clear to auscultation bilaterally [Chest Palpation] : palpation of the chest revealed no abnormalities [Heart Sounds] : normal S1 and S2 [Murmurs] : no murmurs [Edema] : there was no peripheral edema [Abdomen Soft] : soft [Abdomen Tenderness] : non-tender [Nail Clubbing] : no clubbing  or cyanosis of the fingernails [Skin Color & Pigmentation] : normal skin color and pigmentation [] : no rash [No Focal Deficits] : no focal deficits [Scleral Icterus] : No Scleral Icterus [Spider Angioma] : No spider angioma(s) were observed [Abdominal  Ascites] : no ascites [Splenomegaly] : no splenomegaly [Liver Palpable ___ Finger Breadths Below Costal Margin] : was not palpable below costal margin [Asterixis] : no asterixis observed [Jaundice] : No jaundice [Outer Ear] : the ears and nose were normal in appearance [Oriented To Time, Place, And Person] : oriented to person, place, and time [Affect] : the affect was normal [Mood] : the mood was normal

## 2022-08-17 NOTE — REVIEW OF SYSTEMS
[Constipation] : no constipation [Diarrhea] : no diarrhea [Feelings Of Weakness] : feelings of weakness [Negative] : Heme/Lymph

## 2022-08-17 NOTE — ASSESSMENT
[FreeTextEntry1] : 31 year old  male with hx of sinusitis dry mouth dysphagia seen for elevated liver tests\par \par Elevated liver tests\par Liver tests normal Oct 2021.\par Reports mild elevation in ALT 7 months ago. Using  carnation shakes for diet. Was using whey protein\par Wt down from 240 lb 2 years ago to 160 lb\par May 2020 ALT 40's In June 2022 T bili 1.7 AST 66  ALP 92 Albumin 2.3 \par Normal protein electrophoresis IgG TSH \par SAUNDRA ASMA complement DS DNA RA factor ESR negative \par HBsAg HBcAb total HCV ab negative \par HBV HCV autoimmune testing negative\par Repeat liver tests in Aug AST 59  ALP 92 T bili 1.7 .\par Likely due to nutrition related issues. \par Will order CLD work up - Urine copper A1AT Ig levels \par Consider liver biopsy on follow up.\par Elastography as well on follow up in 1 month\par \par Health maintenance\par HCV negative\par Not immune status for hepatitis B and advised vaccination.\par Hepatitis A - borderline. Will repeat ab status\par \par

## 2022-10-19 ENCOUNTER — APPOINTMENT (OUTPATIENT)
Dept: GASTROENTEROLOGY | Facility: CLINIC | Age: 32
End: 2022-10-19

## 2022-10-19 VITALS
SYSTOLIC BLOOD PRESSURE: 114 MMHG | HEART RATE: 84 BPM | DIASTOLIC BLOOD PRESSURE: 80 MMHG | HEIGHT: 70 IN | OXYGEN SATURATION: 99 %

## 2022-10-19 DIAGNOSIS — R74.8 ABNORMAL LEVELS OF OTHER SERUM ENZYMES: ICD-10-CM

## 2022-10-19 DIAGNOSIS — K73.9 CHRONIC HEPATITIS, UNSPECIFIED: ICD-10-CM

## 2022-10-19 PROCEDURE — 91200 LIVER ELASTOGRAPHY: CPT

## 2022-10-19 PROCEDURE — 99213 OFFICE O/P EST LOW 20 MIN: CPT

## 2022-10-19 NOTE — REVIEW OF SYSTEMS
[As Noted in HPI] : as noted in HPI [Fever] : no fever [Chills] : no chills [Eye Pain] : no eye pain [Red Eyes] : eyes not red [Earache] : no earache [Chest Pain] : no chest pain [Palpitations] : no palpitations [Shortness Of Breath] : no shortness of breath [Cough] : no cough [SOB on Exertion] : no shortness of breath during exertion [Joint Swelling] : no joint swelling [Skin Lesions] : no skin lesions [Easy Bleeding] : no tendency for easy bleeding [Easy Bruising] : no tendency for easy bruising

## 2022-10-19 NOTE — ASSESSMENT
[FreeTextEntry1] : 31 year old  male with hx of sinusitis dry mouth dysphagia seen for elevated liver tests\par \par Elevated liver tests\par Liver tests normal Oct 2021.\par Reports mild elevation in ALT 7 months ago. Using  carnation shakes for diet. Was using whey protein\par Wt down from 240 lb 2 years ago to 160 lb\par May 2020 ALT 40's In June 2022 T bili 1.7 AST 66  ALP 92 Albumin 2.3 \par CLD work up - Urine copper not elevated  A1AT MM phenotype  Ig levels \par Normal protein electrophoresis IgG TSH \par SAUNDRA ASMA LKM complement DS DNA RA factor ESR negative \par HBsAg HBcAb total HCV ab negative  ACE level normal \par HBV HCV autoimmune testing negative \par Ferritin 332 \par Repeat liver tests in Aug AST 59  ALP 92 T bili 1.7 .\par Did not do blood work ordered for September. \par Elastography did not show any fibrosis or steatosis \par Likely due to nutrition related issues. \par If liver tests still elevated will get liver biopsy. \par Will check iron sats on follow up.\par \par Health maintenance\par HCV negative\par Not immune status for hepatitis B and advised vaccination.\par Hepatitis A - not immune.  Will advise vaccination on follow up/ \par \par

## 2022-10-19 NOTE — HISTORY OF PRESENT ILLNESS
[Ordering Test(s) ___] : ordering [unfilled] [de-identified] : Still having problems with swallowing and using shakes. No abdominal pain jaundice confusion hematemesis or melena. No supplement use. [de-identified] : Patient has been having problems with dry mouth and was seen by rheumatology. Blood tests showed elevated liver tests with AST 66  in June 2022. Reports mild elevation of AST ALT in 50 - 60 range 7 - 8 months ago. Patient has been having swallowing issues. which occurred suddenly.  With dysphagia has been having shakes - with ensure. Did use whey powder from Oct to Feb 2022.\par Underwent evaluation with EGD which showed hiatal hernia in March 2021 with esophagitis gastritis. EGD in Dec 2021 did not show any abnormality. \par No use of herbal supplements or weed. Was on Clindamycin Dec 2021 for suspected sinus infection. Wt has been stable for 6 months around 150 - 160. Was 240 lb 2 years ago.  [FreeTextEntry1] : No alcohol for past 2 -3 years. Prior social use.

## 2022-10-19 NOTE — PHYSICAL EXAM
[General Appearance - Alert] : alert [General Appearance - Well Nourished] : well nourished [Sclera] : the sclera and conjunctiva were normal [Outer Ear] : the ears and nose were normal in appearance [Neck Cervical Mass (___cm)] : no neck mass was observed [Jugular Venous Distention Increased] : there was no jugular-venous distention [Thyroid Diffuse Enlargement] : the thyroid was not enlarged [Respiration, Rhythm And Depth] : normal respiratory rhythm and effort [Auscultation Breath Sounds / Voice Sounds] : lungs were clear to auscultation bilaterally [Chest Palpation] : palpation of the chest revealed no abnormalities [Heart Sounds] : normal S1 and S2 [Murmurs] : no murmurs [Edema] : there was no peripheral edema [Abdomen Soft] : soft [Abdomen Tenderness] : non-tender [Nail Clubbing] : no clubbing  or cyanosis of the fingernails [] : no rash [Skin Color & Pigmentation] : normal skin color and pigmentation [No Focal Deficits] : no focal deficits [Oriented To Time, Place, And Person] : oriented to person, place, and time [Affect] : the affect was normal [Mood] : the mood was normal [Scleral Icterus] : No Scleral Icterus [Spider Angioma] : No spider angioma(s) were observed [Abdominal  Ascites] : no ascites [Splenomegaly] : no splenomegaly [Liver Palpable ___ Finger Breadths Below Costal Margin] : was not palpable below costal margin [Asterixis] : no asterixis observed [Jaundice] : No jaundice

## 2022-10-26 ENCOUNTER — APPOINTMENT (OUTPATIENT)
Dept: CARDIOLOGY | Facility: CLINIC | Age: 32
End: 2022-10-26

## 2022-11-16 NOTE — ED CDU PROVIDER DISPOSITION NOTE - NSFOLLOWUPINSTRUCTIONS_ED_ALL_ED_FT
Palpitations    A palpitation is the feeling that your heartbeat is irregular or is faster than normal. It may feel like your heart is fluttering or skipping a beat. They may be caused by many things, including smoking, caffeine, alcohol, stress, and certain medicines. Although most causes of palpitations are not serious, palpitations can be a sign of a serious medical problem. Avoid caffeine, alcohol, and tobacco products at home. Try to reduce stress and anxiety and make sure to get plenty of rest.     SEEK IMMEDIATE MEDICAL CARE IF YOU HAVE ANY OF THE FOLLOWING SYMPTOMS: chest pain, shortness of breath, severe headache, dizziness/lightheadedness, or fainting. normal affect/normal behavior

## 2022-12-06 NOTE — ED PROVIDER NOTE - WET READ LAUNCH FT
Patient ambulated 2 laps around the unit and tolerated well. There are no Wet Read(s) to document. There is 1 Wet Read(s) to document.

## 2022-12-09 NOTE — ED PROVIDER NOTE - CCCP TRG CHIEF CMPLNT
12/9/2022      Carrie Doll  [address] [place] [id number]      Dear Ms. Doll,    Your procedure for your left hand is scheduled with Dr. Lc Darby on January 24, 2023 at:    [facility]  [address] [place] [address]  [phone]      Check-in Time: Due to OR instrumentation/equipment needs, we are unable to confirm times in advance. You will be contacted on the afternoon of January 20, 2023 with your arrival time. If we are unable to reach you,  a detailed message will be left for you (please make sure that your voice messaging system is able to accept messages).  Please note that calls are generally made after 2:30 pm. Please make sure to check your messages.      If you have any changes to your insurance, please call me with the new insurance information.  Changes can result in the need to reschedule your procedure to a different location.    The following appointment(s) have been scheduled for you:    · Post-op appointment with FANTASMA López at the [facility], Medical Office [facility] [address], [address] ([address] [place] [address]) on February 2, 2023 at 11:30 am.    If you have any work related and/or disability forms that need to be completed you will need to drop off these forms at any of our [facility] locations. Please be advised that it can take up to 15 business days to complete these requests. Please note that Dr. Darby will not address any forms brought in the day of surgery as this delays other surgical procedures. You can also contact the our Forms Completion Department at [phone].     For questions regarding the procedure, medications, rehab, etc., please contact the nursing staff at [phone] ([facility]).   If you have any scheduling questions or need to reschedule, please contact me at the telephone number and extension listed below.     Thank you,    Carrie/Tanya  at [phone]  Surgery Scheduler for Dr[facility]          Insurance Authorization Need to Know’s    Prior to your surgical procedure, our team will contact your Insurance Company to initiate a PreAuthorization request.      This is not a guarantee of payment from your insurance company, but rather a step taken to ensure that we have all of the information and documentation for them to confirm the procedure is one that is eligible for coverage under your plan.    We will contact you if we either need more information from you to fulfill the requirements of your insurance company, or if we need to discuss any concerns that may lead to postponement or cancellation of your procedure. If you have any questions regarding your surgery authorization, please check with your insurance company or call our office for an update.    If you need information regarding your level of benefits or out-of-pocket expenses, please contact your insurance company directly.  They can also confirm for you whether or not we (the surgeon and the hospital/surgery center) are in your plan’s preferred network (aka ‘in-network’).    What to do if… My Insurance Changes:  If, at any time, your insurance company, plan or even card changes… Please call our office so that our team can be sure to update your records.  We will need to make sure to submit any PreAuth or gauri to the correct, up-to-date insurance plan.      What to do if… My Insurance Requires A Referral:  If your insurance company requires a Referral for Specialty Care or to see a Specialist, you will need to confirm with them if you have one on file.    - If your insurance carrier does not have a referral, then you will need to contact your Primary Care Physician to have one directly submitted to your insurance company ASAP.    - Without a referral on file, your insurance company will not Pre-Authorize your surgery and may not cover any of your care with our  specialty.    What to do if… I have a Workers Compensation (W/C) Claim:  If you have a W/C claim, please be sure to provide our reception team with the information you have regarding your claim ASAP.  We will contact your W/C carrier/adjustor to inform them of your upcoming surgery and check the status of your claim (open vs closed).  We will let you know if they advise of any concerns or issues with your claim.  - Even if you have an open W/C claim, please also provide us with your personal/family insurance.  We will want to be sure this plan is loaded into your account.  We always PreAuthorize with personal insurance as a back-up to W/C.  Otherwise, if W/C doesn’t cover something along the way, you will receive a bill for the services.    What to do if… I have Month-to-Month Coverage/Premiums:  If you have an insurance plan that is paid for month to month, or is subject to plan change on a monthly basis, please be aware we cannot initiate PreAuthorization until just before the month of your surgery, as your insurance company will need to verify your premium payments/eligibility first.    What to do if… I Do Not Have Insurance Coverage or Have other Insurance/Billing Questions:  Please call our Patient Contact Center:  [phone] to speak with a team member about your billing needs, including possible coverage options, setting up payment plans, our fee schedule, etc.     see chief complaint quote

## 2022-12-14 ENCOUNTER — APPOINTMENT (OUTPATIENT)
Dept: CARDIOLOGY | Facility: CLINIC | Age: 32
End: 2022-12-14

## 2023-04-26 ENCOUNTER — APPOINTMENT (OUTPATIENT)
Dept: CARDIOLOGY | Facility: CLINIC | Age: 33
End: 2023-04-26

## 2023-08-23 ENCOUNTER — APPOINTMENT (OUTPATIENT)
Dept: CARDIOLOGY | Facility: CLINIC | Age: 33
End: 2023-08-23

## 2023-09-21 ENCOUNTER — APPOINTMENT (OUTPATIENT)
Dept: CARDIOLOGY | Facility: CLINIC | Age: 33
End: 2023-09-21

## 2023-12-04 ENCOUNTER — APPOINTMENT (OUTPATIENT)
Dept: CARDIOLOGY | Facility: CLINIC | Age: 33
End: 2023-12-04

## 2024-05-30 NOTE — ED ADULT TRIAGE NOTE - PAIN RATING/NUMBER SCALE (0-10): REST
Akil Moyer Patient Age: 70 year old  MESSAGE: Interpreting service used: No    Insurance on file confirmed with caller: Yes    IM/FP- Symptom-  Adult-  Other-     Symptom(s): patient states is losing a good amount of weight with out trying           Appointment: Patient was already seen of this concern on 05.20.24 with Dr. De La Garza .     Site: [place]- Route message to provider's clinical support pool    Advised caller that a nurse will return the call.     Message read back to caller for accuracy: Yes       ALLERGIES:  Ticagrelor  Current Outpatient Medications   Medication Sig Dispense Refill    buPROPion (WELLBUTRIN SR) 150 MG 12 hr tablet Take 1 tablet by mouth daily for 7 days, THEN 1 tablet daily. 367 tablet 1    doxazosin (CARDURA) 8 MG tablet TAKE 1 TABLET BY MOUTH EVERY NIGHT 90 tablet 3    metFORMIN (GLUCOPHAGE-XR) 500 MG 24 hr tablet TAKE 1 TABLET BY MOUTH IN THE MORNING AND IN THE EVENING WITH MEALS 180 tablet 3    rosuvastatin (CRESTOR) 5 MG tablet Take 1 tablet by mouth daily. 90 tablet 3    allopurinol (ZYLOPRIM) 300 MG tablet Take 0.5 tablets by mouth daily. (Start this after current gout flare is resolved.)1/2 tab daily      azelastine (ASTELIN) 0.1 % nasal spray Spray 2 sprays in each nostril 2 times daily as needed for Rhinitis. Use in each nostril as directed      prasugrel (EFFIENT) 10 MG Tab Take 1 tablet by mouth daily. Do not start before January 3, 2024. 90 tablet 3    lisinopril (ZESTRIL) 20 MG tablet Take 1 tablet by mouth daily. 90 tablet 3    ALPRAZolam (XANAX) 0.5 MG tablet Take 1 tablet by mouth nightly as needed for Sleep. 30 tablet 1    metoPROLOL succinate (TOPROL-XL) 25 MG 24 hr tablet Take 1 tablet by mouth daily. 90 tablet 3    omeprazole (PrilOSEC) 20 MG capsule Take 2 capsules by mouth daily. 30 capsule 0    fluticasone (FLONASE) 50 MCG/ACT nasal spray Spray 1 spray in each nostril daily as needed.      sildenafil (VIAGRA) 100 MG tablet Take 1 tablet by mouth as needed  for Erectile Dysfunction. Take 1 Tab by mouth daily as needed. - Oral 10 tablet 5    aspirin 81 MG chewable tablet Take 1 Tab by mouth daily. - Oral      MULTIPLE VITAMIN PO daily. Take  by mouth. - Oral       No current facility-administered medications for this visit.     PHARMACY to use: confirmed           Pharmacy preference(s) on file:   [id number] - [place] - [address] [facility] [address]  [address]  [place] [address]  Phone: [phone] Fax: [phone]      CALL BACK INFO: Ok to leave response (including medical information) on answering machine      PCP: Denzel De La Garza MD         INS: Payor: [employer] BLUE CROSS MEDICARE ADVANTAGE / Plan:  BE [facility] BASIC HMO / Product Type: [employer] MEDICARE ADVANTAGE   PATIENT ADDRESS:  [address] [place] [address]  [place] [address]     0

## 2025-04-10 NOTE — ED ADULT NURSE NOTE - LAST KNOWN WELL DATE/TIME
Patient is aware of his balance and refused to apply for sfs. After speaking with his insurance the patient was out of frequency for Limited. Ref # [id number] rep's name Vicentaalice  
17-Jul-2021 13:35

## 2025-07-30 ENCOUNTER — APPOINTMENT (OUTPATIENT)
Dept: CARDIOLOGY | Facility: CLINIC | Age: 35
End: 2025-07-30
Payer: MEDICAID

## 2025-07-30 ENCOUNTER — NON-APPOINTMENT (OUTPATIENT)
Age: 35
End: 2025-07-30

## 2025-07-30 VITALS
HEART RATE: 96 BPM | SYSTOLIC BLOOD PRESSURE: 124 MMHG | WEIGHT: 157 LBS | DIASTOLIC BLOOD PRESSURE: 80 MMHG | BODY MASS INDEX: 22.48 KG/M2 | HEIGHT: 70 IN

## 2025-07-30 DIAGNOSIS — Z71.3 DIETARY COUNSELING AND SURVEILLANCE: ICD-10-CM

## 2025-07-30 DIAGNOSIS — Z71.82 EXERCISE COUNSELING: ICD-10-CM

## 2025-07-30 DIAGNOSIS — Z71.89 OTHER SPECIFIED COUNSELING: ICD-10-CM

## 2025-07-30 DIAGNOSIS — R00.2 PALPITATIONS: ICD-10-CM

## 2025-07-30 PROCEDURE — 93000 ELECTROCARDIOGRAM COMPLETE: CPT

## 2025-07-30 PROCEDURE — 99204 OFFICE O/P NEW MOD 45 MIN: CPT | Mod: 25

## 2025-08-13 ENCOUNTER — APPOINTMENT (OUTPATIENT)
Dept: CARDIOLOGY | Facility: CLINIC | Age: 35
End: 2025-08-13
Payer: MEDICAID

## 2025-08-13 PROCEDURE — 93306 TTE W/DOPPLER COMPLETE: CPT
